# Patient Record
Sex: FEMALE | Race: WHITE | NOT HISPANIC OR LATINO | Employment: FULL TIME | URBAN - METROPOLITAN AREA
[De-identification: names, ages, dates, MRNs, and addresses within clinical notes are randomized per-mention and may not be internally consistent; named-entity substitution may affect disease eponyms.]

---

## 2023-03-31 LAB
ALANINE AMINOTRANSFERASE (SGPT) (U/L) IN SER/PLAS: 36 U/L (ref 7–45)
ALBUMIN (G/DL) IN SER/PLAS: 3.7 G/DL (ref 3.4–5)
ALKALINE PHOSPHATASE (U/L) IN SER/PLAS: 62 U/L (ref 33–110)
ANION GAP IN SER/PLAS: 9 MMOL/L (ref 10–20)
ASPARTATE AMINOTRANSFERASE (SGOT) (U/L) IN SER/PLAS: 31 U/L (ref 9–39)
BASOPHILS (10*3/UL) IN BLOOD BY AUTOMATED COUNT: 0.06 X10E9/L (ref 0–0.1)
BASOPHILS/100 LEUKOCYTES IN BLOOD BY AUTOMATED COUNT: 0.8 % (ref 0–2)
BILIRUBIN TOTAL (MG/DL) IN SER/PLAS: 0.6 MG/DL (ref 0–1.2)
CALCIUM (MG/DL) IN SER/PLAS: 8.7 MG/DL (ref 8.6–10.3)
CARBON DIOXIDE, TOTAL (MMOL/L) IN SER/PLAS: 28 MMOL/L (ref 21–32)
CHLORIDE (MMOL/L) IN SER/PLAS: 104 MMOL/L (ref 98–107)
CREATININE (MG/DL) IN SER/PLAS: 0.76 MG/DL (ref 0.5–1.05)
EOSINOPHILS (10*3/UL) IN BLOOD BY AUTOMATED COUNT: 0.11 X10E9/L (ref 0–0.7)
EOSINOPHILS/100 LEUKOCYTES IN BLOOD BY AUTOMATED COUNT: 1.5 % (ref 0–6)
ERYTHROCYTE DISTRIBUTION WIDTH (RATIO) BY AUTOMATED COUNT: 13.2 % (ref 11.5–14.5)
ERYTHROCYTE MEAN CORPUSCULAR HEMOGLOBIN CONCENTRATION (G/DL) BY AUTOMATED: 31.7 G/DL (ref 32–36)
ERYTHROCYTE MEAN CORPUSCULAR VOLUME (FL) BY AUTOMATED COUNT: 92 FL (ref 80–100)
ERYTHROCYTES (10*6/UL) IN BLOOD BY AUTOMATED COUNT: 4.37 X10E12/L (ref 4–5.2)
GFR FEMALE: >90 ML/MIN/1.73M2
GLUCOSE (MG/DL) IN SER/PLAS: 93 MG/DL (ref 74–99)
HEMATOCRIT (%) IN BLOOD BY AUTOMATED COUNT: 40.4 % (ref 36–46)
HEMOGLOBIN (G/DL) IN BLOOD: 12.8 G/DL (ref 12–16)
IMMATURE GRANULOCYTES/100 LEUKOCYTES IN BLOOD BY AUTOMATED COUNT: 0.9 % (ref 0–0.9)
LEUKOCYTES (10*3/UL) IN BLOOD BY AUTOMATED COUNT: 7.6 X10E9/L (ref 4.4–11.3)
LYMPHOCYTES (10*3/UL) IN BLOOD BY AUTOMATED COUNT: 2.94 X10E9/L (ref 1.2–4.8)
LYMPHOCYTES/100 LEUKOCYTES IN BLOOD BY AUTOMATED COUNT: 38.8 % (ref 13–44)
MONOCYTES (10*3/UL) IN BLOOD BY AUTOMATED COUNT: 0.71 X10E9/L (ref 0.1–1)
MONOCYTES/100 LEUKOCYTES IN BLOOD BY AUTOMATED COUNT: 9.4 % (ref 2–10)
NEUTROPHILS (10*3/UL) IN BLOOD BY AUTOMATED COUNT: 3.68 X10E9/L (ref 1.2–7.7)
NEUTROPHILS/100 LEUKOCYTES IN BLOOD BY AUTOMATED COUNT: 48.6 % (ref 40–80)
PLATELETS (10*3/UL) IN BLOOD AUTOMATED COUNT: 317 X10E9/L (ref 150–450)
POTASSIUM (MMOL/L) IN SER/PLAS: 3.9 MMOL/L (ref 3.5–5.3)
PROTEIN TOTAL: 6 G/DL (ref 6.4–8.2)
SODIUM (MMOL/L) IN SER/PLAS: 137 MMOL/L (ref 136–145)
UREA NITROGEN (MG/DL) IN SER/PLAS: 11 MG/DL (ref 6–23)

## 2023-04-06 ENCOUNTER — HOSPITAL ENCOUNTER (OUTPATIENT)
Dept: DATA CONVERSION | Facility: HOSPITAL | Age: 48
End: 2023-04-06
Attending: ORTHOPAEDIC SURGERY | Admitting: ORTHOPAEDIC SURGERY
Payer: COMMERCIAL

## 2023-04-06 DIAGNOSIS — F41.9 ANXIETY DISORDER, UNSPECIFIED: ICD-10-CM

## 2023-04-06 DIAGNOSIS — S76.012A STRAIN OF MUSCLE, FASCIA AND TENDON OF LEFT HIP, INITIAL ENCOUNTER: ICD-10-CM

## 2023-04-06 DIAGNOSIS — M76.01 GLUTEAL TENDINITIS, RIGHT HIP: ICD-10-CM

## 2023-04-06 DIAGNOSIS — F32.A DEPRESSION, UNSPECIFIED: ICD-10-CM

## 2023-04-06 DIAGNOSIS — Z79.82 LONG TERM (CURRENT) USE OF ASPIRIN: ICD-10-CM

## 2023-04-06 DIAGNOSIS — M70.62 TROCHANTERIC BURSITIS, LEFT HIP: ICD-10-CM

## 2023-08-17 ENCOUNTER — OFFICE VISIT (OUTPATIENT)
Dept: PRIMARY CARE | Facility: CLINIC | Age: 48
End: 2023-08-17
Payer: COMMERCIAL

## 2023-08-17 VITALS
DIASTOLIC BLOOD PRESSURE: 104 MMHG | WEIGHT: 220 LBS | SYSTOLIC BLOOD PRESSURE: 158 MMHG | HEIGHT: 65 IN | OXYGEN SATURATION: 95 % | HEART RATE: 110 BPM | BODY MASS INDEX: 36.65 KG/M2

## 2023-08-17 DIAGNOSIS — I10 HYPERTENSION, ESSENTIAL: Primary | ICD-10-CM

## 2023-08-17 DIAGNOSIS — M54.17 LUMBOSACRAL RADICULOPATHY: ICD-10-CM

## 2023-08-17 PROBLEM — M47.817 LUMBOSACRAL SPONDYLOSIS: Status: ACTIVE | Noted: 2023-08-17

## 2023-08-17 PROBLEM — L05.91 INFECTED PILONIDAL CYST: Status: ACTIVE | Noted: 2023-08-17

## 2023-08-17 PROBLEM — L05.01 PILONIDAL CYST WITH ABSCESS: Status: ACTIVE | Noted: 2023-08-17

## 2023-08-17 PROBLEM — R20.0 NUMBNESS: Status: RESOLVED | Noted: 2023-08-17 | Resolved: 2023-08-17

## 2023-08-17 PROBLEM — L73.9 FOLLICULITIS: Status: ACTIVE | Noted: 2023-08-17

## 2023-08-17 PROBLEM — M70.62 GREATER TROCHANTERIC BURSITIS, LEFT: Status: RESOLVED | Noted: 2023-08-17 | Resolved: 2023-08-17

## 2023-08-17 PROBLEM — G89.29 CHRONIC LOW BACK PAIN WITHOUT SCIATICA: Status: RESOLVED | Noted: 2023-08-17 | Resolved: 2023-08-17

## 2023-08-17 PROBLEM — M79.18 MUSCULOSKELETAL PAIN: Status: ACTIVE | Noted: 2023-08-17

## 2023-08-17 PROBLEM — M25.561 ARTHRALGIA OF RIGHT LOWER LEG: Status: RESOLVED | Noted: 2023-08-17 | Resolved: 2023-08-17

## 2023-08-17 PROBLEM — M25.561 ARTHRALGIA OF RIGHT LOWER LEG: Status: ACTIVE | Noted: 2023-08-17

## 2023-08-17 PROBLEM — J45.30 MILD PERSISTENT ASTHMA WITHOUT COMPLICATION (HHS-HCC): Status: ACTIVE | Noted: 2023-08-17

## 2023-08-17 PROBLEM — R29.898 WEAKNESS OF LEFT HIP: Status: RESOLVED | Noted: 2023-08-17 | Resolved: 2023-08-17

## 2023-08-17 PROBLEM — M46.1 SACROILIITIS (CMS-HCC): Status: ACTIVE | Noted: 2023-08-17

## 2023-08-17 PROBLEM — M96.1 POSTLAMINECTOMY SYNDROME: Status: ACTIVE | Noted: 2023-08-17

## 2023-08-17 PROBLEM — R06.83 SNORING: Status: ACTIVE | Noted: 2023-08-17

## 2023-08-17 PROBLEM — M96.1 POSTLAMINECTOMY SYNDROME: Status: RESOLVED | Noted: 2023-08-17 | Resolved: 2023-08-17

## 2023-08-17 PROBLEM — M96.0 PSEUDARTHROSIS FOLLOWING SPINAL FUSION: Status: ACTIVE | Noted: 2023-08-17

## 2023-08-17 PROBLEM — M25.562 LEFT KNEE PAIN: Status: ACTIVE | Noted: 2023-08-17

## 2023-08-17 PROBLEM — R20.0 NUMBNESS: Status: ACTIVE | Noted: 2023-08-17

## 2023-08-17 PROBLEM — S43.439A GLENOID LABRUM TEAR: Status: RESOLVED | Noted: 2023-08-17 | Resolved: 2023-08-17

## 2023-08-17 PROBLEM — K57.32 DIVERTICULITIS OF COLON: Status: ACTIVE | Noted: 2023-08-17

## 2023-08-17 PROBLEM — R25.2 MUSCLE CRAMP: Status: RESOLVED | Noted: 2023-08-17 | Resolved: 2023-08-17

## 2023-08-17 PROBLEM — M54.50 CHRONIC LOW BACK PAIN WITHOUT SCIATICA: Status: ACTIVE | Noted: 2023-08-17

## 2023-08-17 PROBLEM — M25.512 ACUTE PAIN OF LEFT SHOULDER: Status: ACTIVE | Noted: 2023-08-17

## 2023-08-17 PROBLEM — L73.9 FOLLICULITIS: Status: RESOLVED | Noted: 2023-08-17 | Resolved: 2023-08-17

## 2023-08-17 PROBLEM — M54.50 CHRONIC LOW BACK PAIN WITHOUT SCIATICA: Status: RESOLVED | Noted: 2023-08-17 | Resolved: 2023-08-17

## 2023-08-17 PROBLEM — F41.9 ANXIETY: Status: ACTIVE | Noted: 2023-08-17

## 2023-08-17 PROBLEM — M46.1 SACROILIITIS (CMS-HCC): Status: RESOLVED | Noted: 2023-08-17 | Resolved: 2023-08-17

## 2023-08-17 PROBLEM — B07.9 WART: Status: ACTIVE | Noted: 2023-08-17

## 2023-08-17 PROBLEM — S73.192A LABRAL TEAR OF LEFT HIP JOINT: Status: ACTIVE | Noted: 2023-08-17

## 2023-08-17 PROBLEM — M70.62 GREATER TROCHANTERIC BURSITIS, LEFT: Status: ACTIVE | Noted: 2023-08-17

## 2023-08-17 PROBLEM — S43.439A GLENOID LABRUM TEAR: Status: ACTIVE | Noted: 2023-08-17

## 2023-08-17 PROBLEM — M96.0 PSEUDARTHROSIS FOLLOWING SPINAL FUSION: Status: RESOLVED | Noted: 2023-08-17 | Resolved: 2023-08-17

## 2023-08-17 PROBLEM — M25.552 LEFT HIP PAIN: Status: RESOLVED | Noted: 2023-08-17 | Resolved: 2023-08-17

## 2023-08-17 PROBLEM — M25.512 ACUTE PAIN OF LEFT SHOULDER: Status: RESOLVED | Noted: 2023-08-17 | Resolved: 2023-08-17

## 2023-08-17 PROBLEM — S76.019A RUPTURE OF TENDON OF HIP ABDUCTOR: Status: ACTIVE | Noted: 2023-08-17

## 2023-08-17 PROBLEM — M16.12 ARTHROPATHY OF LEFT HIP: Status: RESOLVED | Noted: 2023-08-17 | Resolved: 2023-08-17

## 2023-08-17 PROBLEM — L05.01 PILONIDAL CYST WITH ABSCESS: Status: RESOLVED | Noted: 2023-08-17 | Resolved: 2023-08-17

## 2023-08-17 PROBLEM — M79.18 MUSCULOSKELETAL PAIN: Status: RESOLVED | Noted: 2023-08-17 | Resolved: 2023-08-17

## 2023-08-17 PROBLEM — R53.83 FATIGUE: Status: ACTIVE | Noted: 2023-08-17

## 2023-08-17 PROBLEM — R29.898 WEAKNESS OF LEFT HIP: Status: ACTIVE | Noted: 2023-08-17

## 2023-08-17 PROBLEM — B07.9 WART: Status: RESOLVED | Noted: 2023-08-17 | Resolved: 2023-08-17

## 2023-08-17 PROBLEM — M75.82 ROTATOR CUFF TENDINITIS, LEFT: Status: ACTIVE | Noted: 2023-08-17

## 2023-08-17 PROBLEM — M75.82 ROTATOR CUFF TENDINITIS, LEFT: Status: RESOLVED | Noted: 2023-08-17 | Resolved: 2023-08-17

## 2023-08-17 PROBLEM — E66.9 OBESITY: Status: ACTIVE | Noted: 2023-08-17

## 2023-08-17 PROBLEM — S73.192A LABRAL TEAR OF LEFT HIP JOINT: Status: RESOLVED | Noted: 2023-08-17 | Resolved: 2023-08-17

## 2023-08-17 PROBLEM — M25.562 LEFT KNEE PAIN: Status: RESOLVED | Noted: 2023-08-17 | Resolved: 2023-08-17

## 2023-08-17 PROBLEM — S76.019A RUPTURE OF TENDON OF HIP ABDUCTOR: Status: RESOLVED | Noted: 2023-08-17 | Resolved: 2023-08-17

## 2023-08-17 PROBLEM — R53.83 FATIGUE: Status: RESOLVED | Noted: 2023-08-17 | Resolved: 2023-08-17

## 2023-08-17 PROBLEM — K57.30 DIVERTICULOSIS OF COLON: Status: ACTIVE | Noted: 2023-08-17

## 2023-08-17 PROBLEM — G89.29 CHRONIC LOW BACK PAIN WITHOUT SCIATICA: Status: ACTIVE | Noted: 2023-08-17

## 2023-08-17 PROBLEM — R06.83 SNORING: Status: RESOLVED | Noted: 2023-08-17 | Resolved: 2023-08-17

## 2023-08-17 PROBLEM — M16.12 ARTHROPATHY OF LEFT HIP: Status: ACTIVE | Noted: 2023-08-17

## 2023-08-17 PROBLEM — R25.2 MUSCLE CRAMP: Status: ACTIVE | Noted: 2023-08-17

## 2023-08-17 PROBLEM — M25.552 LEFT HIP PAIN: Status: ACTIVE | Noted: 2023-08-17

## 2023-08-17 PROBLEM — G47.00 INSOMNIA, PERSISTENT: Status: ACTIVE | Noted: 2023-08-17

## 2023-08-17 PROBLEM — L05.91 INFECTED PILONIDAL CYST: Status: RESOLVED | Noted: 2023-08-17 | Resolved: 2023-08-17

## 2023-08-17 PROCEDURE — 1036F TOBACCO NON-USER: CPT | Performed by: FAMILY MEDICINE

## 2023-08-17 PROCEDURE — 3077F SYST BP >= 140 MM HG: CPT | Performed by: FAMILY MEDICINE

## 2023-08-17 PROCEDURE — 3080F DIAST BP >= 90 MM HG: CPT | Performed by: FAMILY MEDICINE

## 2023-08-17 PROCEDURE — 99213 OFFICE O/P EST LOW 20 MIN: CPT | Performed by: FAMILY MEDICINE

## 2023-08-17 RX ORDER — DOCUSATE SODIUM 100 MG/1
100000 CAPSULE, LIQUID FILLED ORAL 2 TIMES DAILY
COMMUNITY
Start: 2016-11-16 | End: 2023-11-21 | Stop reason: ALTCHOICE

## 2023-08-17 RX ORDER — DILTIAZEM HYDROCHLORIDE 120 MG/1
120 CAPSULE, EXTENDED RELEASE ORAL DAILY
Qty: 30 CAPSULE | Refills: 11 | Status: SHIPPED | OUTPATIENT
Start: 2023-08-17 | End: 2023-11-21 | Stop reason: ALTCHOICE

## 2023-08-17 RX ORDER — AMITRIPTYLINE HYDROCHLORIDE 25 MG/1
TABLET, FILM COATED ORAL
COMMUNITY
Start: 2023-07-28 | End: 2023-11-21 | Stop reason: ALTCHOICE

## 2023-08-17 RX ORDER — MONTELUKAST SODIUM 10 MG/1
1 TABLET ORAL DAILY
COMMUNITY
Start: 2021-06-17 | End: 2023-11-21 | Stop reason: ALTCHOICE

## 2023-08-17 RX ORDER — CELECOXIB 200 MG/1
200 CAPSULE ORAL 2 TIMES DAILY
Qty: 180 CAPSULE | Refills: 3 | Status: SHIPPED | OUTPATIENT
Start: 2023-08-17 | End: 2023-08-17

## 2023-08-17 RX ORDER — DILTIAZEM HYDROCHLORIDE 120 MG/1
120 CAPSULE, EXTENDED RELEASE ORAL
COMMUNITY
End: 2023-08-17 | Stop reason: SDUPTHER

## 2023-08-17 RX ORDER — CELECOXIB 200 MG/1
CAPSULE ORAL
COMMUNITY
Start: 2022-05-23 | End: 2023-08-17 | Stop reason: SDUPTHER

## 2023-08-17 RX ORDER — GABAPENTIN 600 MG/1
1 TABLET ORAL 4 TIMES DAILY
COMMUNITY
Start: 2016-08-15 | End: 2023-11-21 | Stop reason: ALTCHOICE

## 2023-08-17 ASSESSMENT — ENCOUNTER SYMPTOMS
PALPITATIONS: 0
SWEATS: 0
SHORTNESS OF BREATH: 0
ARTHRALGIAS: 1
NECK PAIN: 0
HYPERTENSION: 1
ORTHOPNEA: 0
BACK PAIN: 1
PND: 0
BLURRED VISION: 0
HEADACHES: 1

## 2023-08-17 ASSESSMENT — PATIENT HEALTH QUESTIONNAIRE - PHQ9
SUM OF ALL RESPONSES TO PHQ9 QUESTIONS 1 AND 2: 0
1. LITTLE INTEREST OR PLEASURE IN DOING THINGS: NOT AT ALL
2. FEELING DOWN, DEPRESSED OR HOPELESS: NOT AT ALL

## 2023-08-17 NOTE — PROGRESS NOTES
"Subjective   Patient ID: Vickie Vuong is a 48 y.o. female who presents for Hypertension.    Hypertension  This is a recurrent problem. The current episode started in the past 7 days. The problem has been gradually worsening since onset. The problem is uncontrolled. Associated symptoms include anxiety, headaches and malaise/fatigue. Pertinent negatives include no blurred vision, chest pain, neck pain, orthopnea, palpitations, peripheral edema, PND, shortness of breath or sweats. There are no associated agents to hypertension. Risk factors for coronary artery disease include obesity and stress. Compliance problems include diet and exercise.       It seems that the multiple orthopedic issues were likely driving the problems with the medicine, overall she felt like she did well with the diltiazem and it helped.  Since she has been off of it her blood pressure is much higher.    Restart diltiazem  mg daily  Office visit 2 months  If she feels her blood pressure is better but still room for improvement in a month, call and we can increase the medicine to 180 mg.    Review of Systems   Constitutional:  Positive for malaise/fatigue.   Eyes:  Negative for blurred vision.   Respiratory:  Negative for shortness of breath.    Cardiovascular:  Negative for chest pain, palpitations, orthopnea and PND.   Musculoskeletal:  Positive for arthralgias and back pain. Negative for neck pain.   Neurological:  Positive for headaches.       Objective   BP (!) 158/104   Pulse 110   Ht 1.651 m (5' 5\")   Wt 99.8 kg (220 lb)   SpO2 95%   BMI 36.61 kg/m²     Physical Exam  Constitutional:       Appearance: Normal appearance.   HENT:      Head: Normocephalic and atraumatic.   Cardiovascular:      Rate and Rhythm: Normal rate and regular rhythm.      Heart sounds: Normal heart sounds.   Pulmonary:      Effort: Pulmonary effort is normal.      Breath sounds: Normal breath sounds.   Skin:     General: Skin is warm and dry. "   Neurological:      General: No focal deficit present.      Mental Status: She is alert and oriented to person, place, and time.   Psychiatric:         Mood and Affect: Mood normal.         Behavior: Behavior normal.         Thought Content: Thought content normal.         Judgment: Judgment normal.         Assessment/Plan   Problem List Items Addressed This Visit       Hypertension, essential - Primary    Relevant Medications    dilTIAZem ER (Tiazac) 120 mg 24 hr capsule    Lumbosacral radiculopathy    Relevant Medications    celecoxib (CeleBREX) 200 mg capsule

## 2023-08-25 ENCOUNTER — HOSPITAL ENCOUNTER (OUTPATIENT)
Dept: DATA CONVERSION | Facility: HOSPITAL | Age: 48
End: 2023-08-25
Attending: PAIN MEDICINE | Admitting: PAIN MEDICINE
Payer: COMMERCIAL

## 2023-08-25 DIAGNOSIS — M54.17 RADICULOPATHY, LUMBOSACRAL REGION: ICD-10-CM

## 2023-09-06 VITALS — WEIGHT: 216.05 LBS | BODY MASS INDEX: 36 KG/M2 | HEIGHT: 65 IN

## 2023-09-14 NOTE — H&P
History & Physical Reviewed:   Pregnant/Lactating:  ·  Are You Pregnant unable to answer   ·  Order Pregnancy Test order urine test   ·  Are You Currently Breastfeeding no     I have reviewed the History and Physical dated:  06-Apr-2023   History and Physical reviewed and relevant findings noted. Patient examined to review pertinent physical  findings.: No significant changes   Home Medications Reviewed: no changes noted   Allergies Reviewed: no changes noted       ERAS (Enhanced Recovery After Surgery):  ·  ERAS Patient: no     Consent:   COVID-19 Consent:  ·  COVID-19 Risk Consent Surgeon has reviewed key risks related to the risk of hema COVID-19 and if they contract COVID-19 what the risks are.     Attestation:   Note Completion:  I am a:  Resident/Fellow   Attending Attestation I saw and evaluated the patient.  I personally obtained the key and critical portions of the history and physical exam or was physically present for key and  critical portions performed by the resident/fellow. I reviewed the resident/fellow?s documentation and discussed the patient with the resident/fellow.  I agree with the resident/fellow?s medical decision making as documented in the note.     I personally evaluated the patient on 06-Apr-2023         Electronic Signatures:  Jessica Navarro (Resident))  (Signed 06-Apr-2023 07:15)   Authored: History & Physical Reviewed, ERAS, Consent,  Note Completion  Antonino Tubbs)  (Signed 06-Apr-2023 10:19)   Authored: Note Completion   Co-Signer: History & Physical Reviewed, ERAS, Consent, Note Completion      Last Updated: 06-Apr-2023 10:19 by Antonino Tubbs)

## 2023-10-01 NOTE — OP NOTE
PROCEDURE DETAILS    Preoperative Diagnosis:  Radiculopathy, lumbosacral region, M54.17    Postoperative Diagnosis:  Radiculopathy, lumbosacral region, M54.17    Surgeon: Piyush Vann  Resident/Fellow/Other Assistant: None of these were associated with this case    Procedure:  1. R L5 + S1 TFESI    Anesthesia: No anesthesiologist associated with this case  Estimated Blood Loss: 0  Findings: NA  Additional Details: The patient has a greater than 2-month history of severe low back and leg pain.  The patient has previously had 6 weeks of conservative management with exercise therapy  and medications.  The patient is compliant with home exercises for this issue.  The pain significantly interrupts the patient's physical function.  The patient does not desire another spine surgery.  The procedure was performed at the right L5 and S1  nerve roots because the patient had symptoms in the distribution of those 2 nerve roots.        Operative Report:   Procedure: Right lumbosacral transforaminal epidural steroid injection under fluoroscopic guidance of the right L5 nerve root at the right L5-S1 foramen and  of the right S1 nerve root at the first sacral foramen on the right  Diagnosis: Lumbosacral radiculopathy  Solution used: 1 mL of Kenalog 40 mg, 3 mL normal saline, 1 mL of lidocaine 2%, 5 mL total. 2.5 mL per site  Anesthesia: Local  Complications: None    After informed consent was obtained, the patient was brought to the OR and placed in the prone position. The area in question was prepped and draped  in sterile fashion. An ipsilateral oblique fluoroscopic view of the lumbar spine was obtained and after 2 ml of lidocaine 2% was injected into the skin at each site, a 22-gauge Chiba needle was inserted into the skin and advanced to the 6 clock position  beneath the right L5 pedicle and a 25 gauge Quincke needle was inserted into the skin and advanced into the S1 foramen on the right side under intermittent  fluoroscopic guidance. Proper needle position was confirmed via both AP and lateral fluoroscopy.   1 mL Omnipaque was injected under live fluoroscopy at each site which demonstrated appropriate epidural and nerve root uptake and the absence of any intravascular or intrathecal spread. The local anesthetic steroid solution was then injected incrementally  at each site. The 2 needles were removed. Bleeding was nil. The patient tolerated the procedure well and was transferred to the recovery room in good condition.                        Attestation:   Note Completion:  Attending Attestation I performed the procedure without a resident         Electronic Signatures:  Piyush Vann)  (Signed 25-Aug-2023 17:27)   Authored: Post-Operative Note, Chart Review, Note Completion      Last Updated: 25-Aug-2023 17:27 by Piyush Vann)

## 2023-10-02 NOTE — OP NOTE
PROCEDURE DETAILS    Preoperative Diagnosis:  Left hip abductor deficiency   Postoperative Diagnosis:  Left hip abductor deficiency   Surgeon: ALLYSON Tubbs MD  Resident/Fellow/Other Assistant: JACQUE Navarro MD    Procedure:  Revision left hip abductor repair with Achilles tendon allograft augmentation   Left hip trochanteric bursectomy   Anesthesia: General with regional   Estimated Blood Loss: 20 mL  Blood Replaced: None  Findings: Consistent with preoperative diagnosis   Specimens(s) Collected: no,     Complications: None  Patient Returned To/Condition: PACU in good condition                                 Attestation:   Note Completion:  Attending Attestation I was present for the entire procedure    I am a: Resident/Fellow         Electronic Signatures:  Jessica Navarro (Resident))  (Signed 06-Apr-2023 10:10)   Authored: Post-Operative Note, Chart Review, Note Completion  Antonino Tubbs)  (Signed 07-Apr-2023 08:18)   Authored: Note Completion   Co-Signer: Post-Operative Note, Chart Review, Note Completion      Last Updated: 07-Apr-2023 08:18 by Antonino Tubbs)

## 2023-10-10 ENCOUNTER — APPOINTMENT (OUTPATIENT)
Dept: PRIMARY CARE | Facility: CLINIC | Age: 48
End: 2023-10-10
Payer: COMMERCIAL

## 2023-10-13 ENCOUNTER — APPOINTMENT (OUTPATIENT)
Dept: PRIMARY CARE | Facility: CLINIC | Age: 48
End: 2023-10-13
Payer: COMMERCIAL

## 2023-10-16 ENCOUNTER — PHARMACY VISIT (OUTPATIENT)
Dept: PHARMACY | Facility: CLINIC | Age: 48
End: 2023-10-16
Payer: COMMERCIAL

## 2023-10-16 PROCEDURE — RXMED WILLOW AMBULATORY MEDICATION CHARGE

## 2023-10-18 ENCOUNTER — PHARMACY VISIT (OUTPATIENT)
Dept: PHARMACY | Facility: CLINIC | Age: 48
End: 2023-10-18
Payer: COMMERCIAL

## 2023-10-18 PROCEDURE — RXMED WILLOW AMBULATORY MEDICATION CHARGE

## 2023-11-01 ENCOUNTER — OFFICE VISIT (OUTPATIENT)
Dept: ORTHOPEDIC SURGERY | Facility: HOSPITAL | Age: 48
End: 2023-11-01
Payer: COMMERCIAL

## 2023-11-01 DIAGNOSIS — S73.192A TEAR OF LEFT ACETABULAR LABRUM, INITIAL ENCOUNTER: Primary | ICD-10-CM

## 2023-11-01 DIAGNOSIS — S76.012D TEAR OF LEFT GLUTEUS MEDIUS TENDON, SUBSEQUENT ENCOUNTER: ICD-10-CM

## 2023-11-01 PROCEDURE — 3080F DIAST BP >= 90 MM HG: CPT | Performed by: PHYSICIAN ASSISTANT

## 2023-11-01 PROCEDURE — 3077F SYST BP >= 140 MM HG: CPT | Performed by: PHYSICIAN ASSISTANT

## 2023-11-01 PROCEDURE — 99213 OFFICE O/P EST LOW 20 MIN: CPT | Performed by: PHYSICIAN ASSISTANT

## 2023-11-01 PROCEDURE — 1036F TOBACCO NON-USER: CPT | Performed by: PHYSICIAN ASSISTANT

## 2023-11-01 NOTE — PROGRESS NOTES
Patient is here today close to 7 months out from her left revision open gluteal repair with Achilles allograft augment.  Date of surgery 4/6/2023.  She is doing great.  She only has a little bit of soreness when she lays on that side.  She is very happy with her outcome.  She is starting to notice some workouts.  She is having pain in the groin when she does this.  Positive FADIR.   She does have some early degenerative changes noted in the hip and femoral acetabular impingement.  She was also noted to have a labral tear on her prior MRI.  She had a fluoroscopy guided intra-articular injection in the past that gave her significant relief back where she works.  She was given an order she would prefer to avoid any surgical intervention for this.  I did give her an order for a repeat injection and she can contact the office if she would like to repeat this in the future.      Isabel Whitten PA-C

## 2023-11-20 ENCOUNTER — PHARMACY VISIT (OUTPATIENT)
Dept: PHARMACY | Facility: CLINIC | Age: 48
End: 2023-11-20
Payer: COMMERCIAL

## 2023-11-20 PROCEDURE — RXMED WILLOW AMBULATORY MEDICATION CHARGE

## 2023-11-20 RX ORDER — AMITRIPTYLINE HYDROCHLORIDE 25 MG/1
TABLET, FILM COATED ORAL
Qty: 30 TABLET | Refills: 4 | OUTPATIENT
Start: 2023-11-20 | End: 2024-05-06 | Stop reason: SDUPTHER

## 2023-11-21 ENCOUNTER — PHARMACY VISIT (OUTPATIENT)
Dept: PHARMACY | Facility: CLINIC | Age: 48
End: 2023-11-21
Payer: COMMERCIAL

## 2023-11-21 ENCOUNTER — HOSPITAL ENCOUNTER (OUTPATIENT)
Dept: RADIOLOGY | Facility: HOSPITAL | Age: 48
Discharge: HOME | End: 2023-11-21
Payer: COMMERCIAL

## 2023-11-21 ENCOUNTER — OFFICE VISIT (OUTPATIENT)
Dept: PAIN MEDICINE | Facility: CLINIC | Age: 48
End: 2023-11-21
Payer: COMMERCIAL

## 2023-11-21 VITALS
WEIGHT: 220 LBS | DIASTOLIC BLOOD PRESSURE: 84 MMHG | RESPIRATION RATE: 16 BRPM | BODY MASS INDEX: 36.65 KG/M2 | HEIGHT: 65 IN | HEART RATE: 108 BPM | SYSTOLIC BLOOD PRESSURE: 153 MMHG

## 2023-11-21 DIAGNOSIS — M25.552 LEFT HIP PAIN: ICD-10-CM

## 2023-11-21 DIAGNOSIS — Z98.1 ARTHRODESIS STATUS: ICD-10-CM

## 2023-11-21 DIAGNOSIS — M54.17 LUMBOSACRAL RADICULOPATHY: Primary | ICD-10-CM

## 2023-11-21 DIAGNOSIS — S73.192A TEAR OF LEFT ACETABULAR LABRUM, INITIAL ENCOUNTER: ICD-10-CM

## 2023-11-21 DIAGNOSIS — M47.27 OSTEOARTHRITIS OF SPINE WITH RADICULOPATHY, LUMBOSACRAL REGION: ICD-10-CM

## 2023-11-21 DIAGNOSIS — M48.062 NEUROGENIC CLAUDICATION DUE TO LUMBAR SPINAL STENOSIS: ICD-10-CM

## 2023-11-21 DIAGNOSIS — M54.17 LUMBOSACRAL NEURITIS: ICD-10-CM

## 2023-11-21 PROCEDURE — RXMED WILLOW AMBULATORY MEDICATION CHARGE

## 2023-11-21 PROCEDURE — 77002 NEEDLE LOCALIZATION BY XRAY: CPT | Mod: LEFT SIDE | Performed by: RADIOLOGY

## 2023-11-21 PROCEDURE — 77002 NEEDLE LOCALIZATION BY XRAY: CPT | Mod: LT

## 2023-11-21 PROCEDURE — 99213 OFFICE O/P EST LOW 20 MIN: CPT | Mod: 25 | Performed by: PHYSICIAN ASSISTANT

## 2023-11-21 PROCEDURE — 96372 THER/PROPH/DIAG INJ SC/IM: CPT | Performed by: PHYSICIAN ASSISTANT

## 2023-11-21 PROCEDURE — 2500000004 HC RX 250 GENERAL PHARMACY W/ HCPCS (ALT 636 FOR OP/ED): Performed by: PHYSICIAN ASSISTANT

## 2023-11-21 PROCEDURE — 20610 DRAIN/INJ JOINT/BURSA W/O US: CPT | Mod: LEFT SIDE | Performed by: RADIOLOGY

## 2023-11-21 PROCEDURE — 2550000001 HC RX 255 CONTRASTS: Performed by: PHYSICIAN ASSISTANT

## 2023-11-21 RX ORDER — GABAPENTIN 600 MG/1
1200 TABLET ORAL 3 TIMES DAILY
Qty: 540 TABLET | Refills: 1 | Status: SHIPPED | OUTPATIENT
Start: 2023-11-21 | End: 2024-05-07 | Stop reason: ALTCHOICE

## 2023-11-21 RX ORDER — METHYLPREDNISOLONE ACETATE 40 MG/ML
40 INJECTION, SUSPENSION INTRA-ARTICULAR; INTRALESIONAL; INTRAMUSCULAR; SOFT TISSUE ONCE
Status: COMPLETED | OUTPATIENT
Start: 2023-11-21 | End: 2023-11-21

## 2023-11-21 RX ADMIN — IOHEXOL 6 ML: 350 INJECTION, SOLUTION INTRAVENOUS at 13:43

## 2023-11-21 RX ADMIN — METHYLPREDNISOLONE ACETATE 40 MG: 40 INJECTION, SUSPENSION INTRA-ARTICULAR; INTRALESIONAL; INTRAMUSCULAR; INTRASYNOVIAL; SOFT TISSUE at 14:02

## 2023-11-21 ASSESSMENT — PAIN SCALES - GENERAL: PAINLEVEL: 2

## 2023-11-21 ASSESSMENT — ENCOUNTER SYMPTOMS
EYES NEGATIVE: 1
ARTHRALGIAS: 1
ENDOCRINE NEGATIVE: 1
CARDIOVASCULAR NEGATIVE: 1
CONSTITUTIONAL NEGATIVE: 1
PSYCHIATRIC NEGATIVE: 1
BACK PAIN: 1
RESPIRATORY NEGATIVE: 1
ALLERGIC/IMMUNOLOGIC NEGATIVE: 1
HEMATOLOGIC/LYMPHATIC NEGATIVE: 1
GASTROINTESTINAL NEGATIVE: 1

## 2023-11-21 ASSESSMENT — COLUMBIA-SUICIDE SEVERITY RATING SCALE - C-SSRS
6. HAVE YOU EVER DONE ANYTHING, STARTED TO DO ANYTHING, OR PREPARED TO DO ANYTHING TO END YOUR LIFE?: NO
2. HAVE YOU ACTUALLY HAD ANY THOUGHTS OF KILLING YOURSELF?: NO
1. IN THE PAST MONTH, HAVE YOU WISHED YOU WERE DEAD OR WISHED YOU COULD GO TO SLEEP AND NOT WAKE UP?: NO

## 2023-11-21 NOTE — PROGRESS NOTES
Subjective   Patient ID: Vickie Vuong is a 48 y.o. female who presents for Pain (FUV for yanni low back pain L>R 2/10 today. MRI done 9-13-23 at . Pain is constant, achey. Motrin helps decrease pain. ANA = 30/100. Smoking, depression screen negative. BMI eduction provided. ).  Refill for gabapentin.  Patient is a 48-year-old female.  She presents today for follow-up after undergoing an updated lumbar MRI.  She states though that at this time, things are overall fairly well controlled.  She has some back and leg pain that she rates a 2/10 but she states it is manageable.  She states that usually in the winter she is not very active.  She does not try to do a month so she does not notice much by the way of pain or problems at this time.  She is is here today to review the MRI and she also would like a refill of her gabapentin.  She usually uses 1200 mg at night.  Sometimes 600 mg in the morning depending on how she is feeling and how she is doing.  She feels that between the previous injection the intermittent gabapentin, and the fact that it is getting into the colder season she is overall doing fairly well.        Review of Systems   Constitutional: Negative.    HENT: Negative.     Eyes: Negative.    Respiratory: Negative.     Cardiovascular: Negative.    Gastrointestinal: Negative.    Endocrine: Negative.    Genitourinary: Negative.    Musculoskeletal:  Positive for arthralgias, back pain and gait problem.   Skin: Negative.    Allergic/Immunologic: Negative.    Hematological: Negative.    Psychiatric/Behavioral: Negative.         Objective   Physical Exam  Vitals and nursing note reviewed.   Constitutional:       Appearance: Normal appearance. She is obese.   HENT:      Head: Normocephalic and atraumatic.      Right Ear: External ear normal.      Left Ear: External ear normal.      Mouth/Throat:      Mouth: Mucous membranes are moist.      Pharynx: Oropharynx is clear.   Eyes:      Conjunctiva/sclera:  Conjunctivae normal.      Pupils: Pupils are equal, round, and reactive to light.   Cardiovascular:      Rate and Rhythm: Normal rate and regular rhythm.      Pulses: Normal pulses.   Musculoskeletal:         General: Normal range of motion.      Cervical back: Normal range of motion.      Comments: 5/5 strength   Skin:     General: Skin is warm and dry.   Neurological:      General: No focal deficit present.      Mental Status: She is alert and oriented to person, place, and time. Mental status is at baseline.   Psychiatric:         Mood and Affect: Mood normal.         Behavior: Behavior normal.         Thought Content: Thought content normal.         Judgment: Judgment normal.         MR lumbar spine wo IV contrast  Status: Final result     PACS Images     Show images for MR lumbar spine wo IV contrast  Signed by    Signed Time Phone Pager   Cher Preston MD 9/18/2023 10:44 [phone]     Exam Information    Status Exam Begun Exam Ended   Final  9/18/2023 07:45     Study Result    Narrative & Impression   Interpreted By:  CHER PRESTON MD  MRN: [id number]  Patient Name: LEORA QUILES     STUDY:  MRI L-SPINE WO; ;  9/18/2023 7:45 am     INDICATION:  lower back and leg pain  M54.50: Chronic low back pain without  sciatica, unspecified back pain laterality M54.17: Lumbosacral  radiculopathy M47.817: Lumbosacral spondylosis.     COMPARISON:  MRI of the lumbar spine from 07/11/2022.     ACCESSION NUMBER(S):  [id number]     ORDERING CLINICIAN:  SUBHA ESTES     TECHNIQUE:  MRI of the lumbar spine was performed with acquisition of sagittal  T2, sagittal T1, sagittal STIR, axial T1, and axial T2 weighted  sequences.     FINDINGS:  This report assumes 5 non-rib bearing lumbar vertebral bodies. The  lowest intervertebral disc will be labeled L5-S1.     There is stable slight retrolisthesis at L2-L3. Grade 1  anterolisthesis at L3-L4 appears slightly more pronounced. There is  stable grade 1 anterolisthesis at  L5-S1. There is stable mild  posterior wedging of the L5 vertebral body. Remaining vertebral body  heights are maintained.     There are postoperative changes from laminectomies at L5 with spinal  fusion including placement of bilateral pedicular screws which  terminate within the L4, L5, and S1 vertebral bodies and are  interconnected by parallel rods. There is also an intervertebral disc  spacer at L5-S1. There is disc desiccation at multiple levels. There  are stable chronic degenerative endplate changes at multiple levels.     The conus medullaris terminates at the level of L1-L2 and is  unremarkable in appearance.     At T12-L1, there is no posterior disc contour abnormality. There is  no spinal canal stenosis or neural foraminal narrowing. There is no  facet osteoarthropathy.     At L1-L2, there is striking posterior disc contour abnormality. There  is no spinal canal stenosis or neural foraminal narrowing. There is  no facet osteoarthropathy.     At L2-L3, there is a stable small diffuse disc bulge. There is no  spinal canal stenosis. There is stable extension of disc into the  neural foramina and there is at most mild left neural foraminal  narrowing, unchanged. There is stable moderate left and mild right  facet osteoarthropathy.     At L3-L4, there is mild spinal canal stenosis due to combination of  diffuse disc bulge, grade 1 anterolisthesis, ligamentum flavum  thickening, prominent posterior epidural fat, and marked facet  osteoarthropathy. Spinal canal stenosis has slightly increased since  prior. There is extension of disc into the bilateral neural foramina  and along with facet osteoarthropathy causes mild bilateral neural  foraminal narrowing, unchanged.     At L4-L5, there is no posterior disc contour abnormality. There is no  spinal canal stenosis or neural foraminal narrowing. Not adequately  evaluate the facet joints due to susceptibility artifact from  surgical hardware, noting this, there is  fusion of the facet joints  related to prior spinal surgery.     At L5-S1, is no posterior disc contour abnormality. There is no  spinal canal stenosis. There is stable moderate to marked right and  moderate left neural foraminal narrowing primarily due to facet  osteoarthropathy. There is stable signal abnormality located within  the right lateral epidural space, probably sequela of prior surgery.     IMPRESSION:  Compared to the MRI lumbar spine study from 07/11/2022, mild spinal  canal stenosis at L3-L4 has slightly increased with slight increase  in grade 1 anterolisthesis. Otherwise, similar degenerative changes  of the lumbar spine and postoperative changes.     This study was interpreted at [facility].     Assessment/Plan   Diagnoses and all orders for this visit:  Lumbosacral radiculopathy  Osteoarthritis of spine with radiculopathy, lumbosacral region  Arthrodesis status  Lumbosacral neuritis  Neurogenic claudication due to lumbar spinal stenosis       Patient is a 48-year-old female with a past medical history significant for previous lumbar fusion, adjacent level stenosis, lumbar neuritis, and chronic pain.  We reviewed her MRI.  At this time, she does not feel that a repeat injection is necessary.  She feels that things are overall going fairly well for her.  She feels that between the gabapentin, the previous injection, and the fact that it is winter and she is not super active in the winter that things are overall going fairly well for her.  She is feeling fairly well and she is fairly comfortable.  OARRS was reviewed.  A refill of gabapentin was sent to the pharmacy.  She is going to call us should she decide that an injection is necessary or that she needs anything from our services in the meantime.  Otherwise, at this time she does not feel that she needs to do anything differently and while she was continue things how they are.

## 2023-11-28 ENCOUNTER — APPOINTMENT (OUTPATIENT)
Dept: PAIN MEDICINE | Facility: CLINIC | Age: 48
End: 2023-11-28
Payer: COMMERCIAL

## 2023-12-10 ENCOUNTER — PHARMACY VISIT (OUTPATIENT)
Dept: PHARMACY | Facility: CLINIC | Age: 48
End: 2023-12-10

## 2023-12-10 PROCEDURE — RXOTC WILLOW AMBULATORY OTC CHARGE

## 2023-12-29 PROCEDURE — RXMED WILLOW AMBULATORY MEDICATION CHARGE

## 2023-12-29 RX ORDER — MONTELUKAST SODIUM 10 MG/1
10 TABLET ORAL NIGHTLY
Qty: 30 TABLET | Refills: 3 | Status: CANCELLED | OUTPATIENT
Start: 2023-12-29 | End: 2024-12-28

## 2023-12-31 ENCOUNTER — PHARMACY VISIT (OUTPATIENT)
Dept: PHARMACY | Facility: CLINIC | Age: 48
End: 2023-12-31
Payer: COMMERCIAL

## 2024-01-29 DIAGNOSIS — J45.30 MILD PERSISTENT ASTHMA WITHOUT COMPLICATION (HHS-HCC): Primary | ICD-10-CM

## 2024-01-29 PROCEDURE — RXMED WILLOW AMBULATORY MEDICATION CHARGE

## 2024-01-29 RX ORDER — MONTELUKAST SODIUM 10 MG/1
10 TABLET ORAL NIGHTLY
Qty: 90 TABLET | Refills: 3 | Status: SHIPPED | OUTPATIENT
Start: 2024-01-29 | End: 2025-01-28

## 2024-02-02 ENCOUNTER — PHARMACY VISIT (OUTPATIENT)
Dept: PHARMACY | Facility: CLINIC | Age: 49
End: 2024-02-02
Payer: COMMERCIAL

## 2024-02-29 PROCEDURE — RXMED WILLOW AMBULATORY MEDICATION CHARGE

## 2024-03-02 ENCOUNTER — PHARMACY VISIT (OUTPATIENT)
Dept: PHARMACY | Facility: CLINIC | Age: 49
End: 2024-03-02
Payer: COMMERCIAL

## 2024-04-08 PROCEDURE — RXMED WILLOW AMBULATORY MEDICATION CHARGE

## 2024-04-09 ENCOUNTER — PHARMACY VISIT (OUTPATIENT)
Dept: PHARMACY | Facility: CLINIC | Age: 49
End: 2024-04-09
Payer: COMMERCIAL

## 2024-04-09 ENCOUNTER — OFFICE VISIT (OUTPATIENT)
Dept: PAIN MEDICINE | Facility: CLINIC | Age: 49
End: 2024-04-09
Payer: COMMERCIAL

## 2024-04-09 VITALS
SYSTOLIC BLOOD PRESSURE: 143 MMHG | WEIGHT: 211 LBS | HEART RATE: 93 BPM | DIASTOLIC BLOOD PRESSURE: 85 MMHG | RESPIRATION RATE: 16 BRPM | BODY MASS INDEX: 35.11 KG/M2

## 2024-04-09 DIAGNOSIS — M47.27 OSTEOARTHRITIS OF SPINE WITH RADICULOPATHY, LUMBOSACRAL REGION: ICD-10-CM

## 2024-04-09 DIAGNOSIS — M54.17 LUMBOSACRAL RADICULOPATHY: Primary | ICD-10-CM

## 2024-04-09 PROCEDURE — 99213 OFFICE O/P EST LOW 20 MIN: CPT | Performed by: PHYSICIAN ASSISTANT

## 2024-04-09 PROCEDURE — RXMED WILLOW AMBULATORY MEDICATION CHARGE

## 2024-04-09 RX ORDER — PREGABALIN 200 MG/1
200 CAPSULE ORAL NIGHTLY
Qty: 30 CAPSULE | Refills: 1 | Status: SHIPPED | OUTPATIENT
Start: 2024-04-09

## 2024-04-09 ASSESSMENT — ENCOUNTER SYMPTOMS
GASTROINTESTINAL NEGATIVE: 1
HEMATOLOGIC/LYMPHATIC NEGATIVE: 1
NUMBNESS: 1
PSYCHIATRIC NEGATIVE: 1
EYES NEGATIVE: 1
CARDIOVASCULAR NEGATIVE: 1
CONSTITUTIONAL NEGATIVE: 1
MUSCULOSKELETAL NEGATIVE: 1
ALLERGIC/IMMUNOLOGIC NEGATIVE: 1
RESPIRATORY NEGATIVE: 1
ENDOCRINE NEGATIVE: 1

## 2024-04-09 ASSESSMENT — PATIENT HEALTH QUESTIONNAIRE - PHQ9
1. LITTLE INTEREST OR PLEASURE IN DOING THINGS: NOT AT ALL
2. FEELING DOWN, DEPRESSED OR HOPELESS: NOT AT ALL
SUM OF ALL RESPONSES TO PHQ9 QUESTIONS 1 AND 2: 0

## 2024-04-09 ASSESSMENT — COLUMBIA-SUICIDE SEVERITY RATING SCALE - C-SSRS
2. HAVE YOU ACTUALLY HAD ANY THOUGHTS OF KILLING YOURSELF?: NO
1. IN THE PAST MONTH, HAVE YOU WISHED YOU WERE DEAD OR WISHED YOU COULD GO TO SLEEP AND NOT WAKE UP?: NO
6. HAVE YOU EVER DONE ANYTHING, STARTED TO DO ANYTHING, OR PREPARED TO DO ANYTHING TO END YOUR LIFE?: NO

## 2024-04-09 NOTE — PROGRESS NOTES
"Subjective   Patient ID: Vickie Vuong is a 48 y.o. female who presents for Pain (FUV for R low back pain; pain score 1/10 today. Patient states pain is more of an \"irritation.\" Pain is described as a \"nagging constant pain\". Excessive exercise or activity will increase pain. Nothing seems to take pain completely away. ANA = 12/100. Falls screen N/A. Depression and smoking screen negative. ).     Connie Dominguez RN 04/09/24 3:18 PM     Patient is a 48-year-old female.  She presents today for eye open to take a 3-month medication management follow-up.  She states though that at this time, things are overall fairly well controlled.  She has some back and leg pain that she rates a 1/10 but she states it is manageable.  It is an aching type pain that is constant.  She has been trying to be more active.  She has been trying to lose weight.  She has been using gabapentin 1800 mg at bedtime.  She does not take any during the day.  She tolerates it.  She is open to trying other things and getting more relief if she can but does not want to be too aggressive.  She does not want to do any injections or have another surgery.    She has a history of L5-S1 ALIF on 7/15/15 done by Dr. Gianni Cordova and then a posterior lumbar fusion L4-S1 in 2017 by Dr. Humza Boyd.        Review of Systems   Constitutional: Negative.    HENT: Negative.     Eyes: Negative.    Respiratory: Negative.     Cardiovascular: Negative.    Gastrointestinal: Negative.    Endocrine: Negative.    Genitourinary: Negative.    Musculoskeletal: Negative.    Skin: Negative.    Allergic/Immunologic: Negative.    Neurological:  Positive for numbness.   Hematological: Negative.    Psychiatric/Behavioral: Negative.         Objective   Physical Exam  Vitals and nursing note reviewed.   Constitutional:       Appearance: Normal appearance.   HENT:      Head: Normocephalic and atraumatic.      Right Ear: External ear normal.      Left Ear: External ear normal.      Nose: Nose " normal.      Mouth/Throat:      Pharynx: Oropharynx is clear.   Eyes:      Conjunctiva/sclera: Conjunctivae normal.   Cardiovascular:      Rate and Rhythm: Normal rate and regular rhythm.      Pulses: Normal pulses.   Pulmonary:      Effort: Pulmonary effort is normal.   Musculoskeletal:         General: Normal range of motion.      Cervical back: Normal range of motion.      Comments: 5/5 strength   Skin:     General: Skin is warm and dry.   Neurological:      General: No focal deficit present.      Mental Status: She is alert and oriented to person, place, and time. Mental status is at baseline.   Psychiatric:         Mood and Affect: Mood normal.         Behavior: Behavior normal.         Thought Content: Thought content normal.         Judgment: Judgment normal.       MR lumbar spine wo IV contrast  Status: Final result     PACS Images     Show images for MR lumbar spine wo IV contrast  Signed by    Signed Time Phone Pager   Cher Preston MD 9/18/2023 10:44 [phone]     Exam Information    Status Exam Begun Exam Ended   Final  9/18/2023 07:45     Study Result    Narrative   Interpreted By:  CHER PRESTON MD  MRN: [id number]  Patient Name: LEORA QUILES     STUDY:  MRI L-SPINE WO; ;  9/18/2023 7:45 am     INDICATION:  lower back and leg pain  M54.50: Chronic low back pain without  sciatica, unspecified back pain laterality M54.17: Lumbosacral  radiculopathy M47.817: Lumbosacral spondylosis.     COMPARISON:  MRI of the lumbar spine from 07/11/2022.     ACCESSION NUMBER(S):  [id number]     ORDERING CLINICIAN:  SUBHA ESTES     TECHNIQUE:  MRI of the lumbar spine was performed with acquisition of sagittal  T2, sagittal T1, sagittal STIR, axial T1, and axial T2 weighted  sequences.     FINDINGS:  This report assumes 5 non-rib bearing lumbar vertebral bodies. The  lowest intervertebral disc will be labeled L5-S1.     There is stable slight retrolisthesis at L2-L3. Grade 1  anterolisthesis at L3-L4 appears  slightly more pronounced. There is  stable grade 1 anterolisthesis at L5-S1. There is stable mild  posterior wedging of the L5 vertebral body. Remaining vertebral body  heights are maintained.     There are postoperative changes from laminectomies at L5 with spinal  fusion including placement of bilateral pedicular screws which  terminate within the L4, L5, and S1 vertebral bodies and are  interconnected by parallel rods. There is also an intervertebral disc  spacer at L5-S1. There is disc desiccation at multiple levels. There  are stable chronic degenerative endplate changes at multiple levels.     The conus medullaris terminates at the level of L1-L2 and is  unremarkable in appearance.     At T12-L1, there is no posterior disc contour abnormality. There is  no spinal canal stenosis or neural foraminal narrowing. There is no  facet osteoarthropathy.     At L1-L2, there is striking posterior disc contour abnormality. There  is no spinal canal stenosis or neural foraminal narrowing. There is  no facet osteoarthropathy.     At L2-L3, there is a stable small diffuse disc bulge. There is no  spinal canal stenosis. There is stable extension of disc into the  neural foramina and there is at most mild left neural foraminal  narrowing, unchanged. There is stable moderate left and mild right  facet osteoarthropathy.     At L3-L4, there is mild spinal canal stenosis due to combination of  diffuse disc bulge, grade 1 anterolisthesis, ligamentum flavum  thickening, prominent posterior epidural fat, and marked facet  osteoarthropathy. Spinal canal stenosis has slightly increased since  prior. There is extension of disc into the bilateral neural foramina  and along with facet osteoarthropathy causes mild bilateral neural  foraminal narrowing, unchanged.     At L4-L5, there is no posterior disc contour abnormality. There is no  spinal canal stenosis or neural foraminal narrowing. Not adequately  evaluate the facet joints due to  susceptibility artifact from  surgical hardware, noting this, there is fusion of the facet joints  related to prior spinal surgery.     At L5-S1, is no posterior disc contour abnormality. There is no  spinal canal stenosis. There is stable moderate to marked right and  moderate left neural foraminal narrowing primarily due to facet  osteoarthropathy. There is stable signal abnormality located within  the right lateral epidural space, probably sequela of prior surgery.      Impression   Compared to the MRI lumbar spine study from 07/11/2022, mild spinal  canal stenosis at L3-L4 has slightly increased with slight increase  in grade 1 anterolisthesis. Otherwise, similar degenerative changes  of the lumbar spine and postoperative changes.     This study was interpreted at [facility].     MACRO:  None     Result History    MR lumbar spine wo IV contrast (Order #[id number]) on 9/18/2023 - Order Result History Report    XR spine  Status: Final result     PACS Images     Show images for XR spine  Signed by    Signed Time Phone Pager   Kirby Dumont DO 8/18/2023 10:43 [phone]     Exam Information    Status Exam Begun Exam Ended   Final  8/17/2023 06:54     Study Result    Narrative   Interpreted By:  KIRBY DUMONT DO  MRN: [id number]  Patient Name: LEORA QUILES     STUDY:  SPINE, LUMBOSACRAL  CMPLT(BENDING); ;  8/17/2023 6:54 am     INDICATION:  pain  M54.17: Lumbosacral radiculopathy.     COMPARISON:  09/26/2020     ACCESSION NUMBER(S):  [id number]     ORDERING CLINICIAN:  SLIME CRYSTAL     FINDINGS:  7 view lumbar spine     Bones appear demineralized. Postoperative changes with indwelling  surgical hardware at the lower lumbar and lumbosacral spine with  indwelling surgical hardware appearing grossly intact and similar to  prior. No new significant compression deformity. Facet arthropathy  mid to lower lumbar spine. Multilevel disc space narrowing most  notably L5-S1 overall  slightly worsened since prior. Once again there  is approximate 9 mm anterolisthesis L5 on S1 in the neutral position.  This again measures approximately 9 mm with flexion and 1 cm with  extension. There is also mild anterolisthesis L3 on L4 during flexion  which is new or slightly worsened. This is less pronounced in  extension.     Visualized bowel gas pattern is nonspecific.      Impression   Postoperative changes lower lumbar and lumbosacral spine with  indwelling surgical hardware redemonstrated as above.     Approximate 9 mm anterolisthesis L5 on S1 redemonstrated as described  grossly similar to prior. Mild anterolisthesis of L3 on L4 during  flexion, new or slightly worsened.        MACRO:  None     Assessment/Plan   Diagnoses and all orders for this visit:  Lumbosacral radiculopathy  Osteoarthritis of spine with radiculopathy, lumbosacral region       Patient is a 48-year-old female with a past medical history significant for previous lumbar fusion, adjacent level lumbar spondylolisthesis and lumbar neuritis.  At this time, we once again reviewed her MRI.  We discussed different options but does not want to have an injection.  She does not want to have another surgery.  She states that the pain is controlled with the gabapentin but is still an aching type pain that is always present.  We discussed her gabapentin.  She is using 1800 mg at bedtime.  We discussed trialing Lyrica.  Potential side effects were discussed.  How to switch was discussed.  She is going to discontinue the gabapentin and trial Lyrica.  200 mg at bedtime.  OARRS reviewed.  Prescription sent to the pharmacy.  Follow-up in 1 month.  Call the clinic sooner if necessary.

## 2024-05-02 PROCEDURE — RXMED WILLOW AMBULATORY MEDICATION CHARGE

## 2024-05-06 ENCOUNTER — OFFICE VISIT (OUTPATIENT)
Dept: PRIMARY CARE | Facility: CLINIC | Age: 49
End: 2024-05-06
Payer: COMMERCIAL

## 2024-05-06 VITALS
SYSTOLIC BLOOD PRESSURE: 146 MMHG | DIASTOLIC BLOOD PRESSURE: 88 MMHG | HEART RATE: 81 BPM | OXYGEN SATURATION: 95 % | WEIGHT: 209.1 LBS | HEIGHT: 65 IN | BODY MASS INDEX: 34.84 KG/M2

## 2024-05-06 DIAGNOSIS — I10 HYPERTENSION, ESSENTIAL: Primary | ICD-10-CM

## 2024-05-06 DIAGNOSIS — G43.009 MIGRAINE WITHOUT AURA AND WITHOUT STATUS MIGRAINOSUS, NOT INTRACTABLE: ICD-10-CM

## 2024-05-06 PROCEDURE — 99214 OFFICE O/P EST MOD 30 MIN: CPT | Performed by: FAMILY MEDICINE

## 2024-05-06 PROCEDURE — 1036F TOBACCO NON-USER: CPT | Performed by: FAMILY MEDICINE

## 2024-05-06 PROCEDURE — RXMED WILLOW AMBULATORY MEDICATION CHARGE

## 2024-05-06 PROCEDURE — 3079F DIAST BP 80-89 MM HG: CPT | Performed by: FAMILY MEDICINE

## 2024-05-06 PROCEDURE — 3077F SYST BP >= 140 MM HG: CPT | Performed by: FAMILY MEDICINE

## 2024-05-06 RX ORDER — DILTIAZEM HYDROCHLORIDE 180 MG/1
180 CAPSULE, COATED, EXTENDED RELEASE ORAL DAILY
Qty: 90 CAPSULE | Refills: 3 | Status: SHIPPED | OUTPATIENT
Start: 2024-05-06 | End: 2025-05-06

## 2024-05-06 RX ORDER — AMITRIPTYLINE HYDROCHLORIDE 50 MG/1
50 TABLET, FILM COATED ORAL NIGHTLY
Qty: 30 TABLET | Refills: 11 | Status: SHIPPED | OUTPATIENT
Start: 2024-05-06 | End: 2025-05-06

## 2024-05-06 ASSESSMENT — PATIENT HEALTH QUESTIONNAIRE - PHQ9
2. FEELING DOWN, DEPRESSED OR HOPELESS: NOT AT ALL
SUM OF ALL RESPONSES TO PHQ9 QUESTIONS 1 AND 2: 0
1. LITTLE INTEREST OR PLEASURE IN DOING THINGS: NOT AT ALL

## 2024-05-06 ASSESSMENT — ENCOUNTER SYMPTOMS
DIZZINESS: 0
FATIGUE: 0
SHORTNESS OF BREATH: 0
PALPITATIONS: 0
HEADACHES: 1

## 2024-05-06 NOTE — OP NOTE
PREOPERATIVE DIAGNOSIS:  1. Recurrent left hip gluteus medius and gluteus minimus tear after  prior repair at an outside hospital.   2. Recalcitrant trochanteric bursitis.    POSTOPERATIVE DIAGNOSIS:    OPERATION/PROCEDURE:  1. Revision left mini open gluteal tendon repair with Achilles  allograft augmentation.   2. Trochanteric bursectomy.    SURGEON:  Antonino Tubbs MD.    ASSISTANT(S):  First assistant:  Isabel Whitten PA-C.  Please note that we will be  billing for my physician's assistant as she was critical and  necessary for successful completion of this case including limb  positioning, anchor placement, suture management, and wound closure.     Please note that we will be billing this as a 22 modifier based on  the complexity of the case requiring allograft and working for a  prior surgical bed that had a previous failed gluteal repair.     ANESTHESIA:    COMPLICATION:  None.    BLOOD LOSS:  Minimal.    INDICATION FOR PROCEDURE:  The patient is a pleasant, but unfortunate female presented to my  office with persistent lateral-sided hip pain after undergoing a  previous open gluteal tendon repair at an outside institution.  She  had persistent pain that was debilitating for her in her life and she  had a new MRI scan that revealed recurrence of her gluteal tear.  We  had spoken to her about her options including continued nonoperative  versus operative intervention.  She elected for operative  intervention after understanding the risks and benefits of surgery  which included, but not limited to, bleeding, infection, damage to  nerves or blood vessels, need for further procedure, risks of  anesthesia, blood clots, progression of osteoarthritis, incomplete  pain relief, risk of re-tear, risk of stiffness, and scarring.  The  patient understood these risks and wished to proceed with the  operative intervention.     PROCEDURE AND FINDINGS:  The patient was identified in the preoperative holding  area.  Operative extremity was marked with an indelible marker.  Informed  consent was reviewed.  She was taken to the operating room, where a  time-out was performed verifying correct site, side, procedure, and  our special equipment.  She was placed supine on the operating room  table.  All bony prominences well padded, prepped and draped in usual  sterile fashion after anesthesia induced without difficulty.  She was  placed in the lateral decubitus position prior to being prepped and  draped and SCD had been placed on the nonoperative leg for DVT  prophylaxis.  Antibiotics were infused intravenously.  An axillary  roll was placed.  All bony prominences were well padded and nerves  were protected.  We began by prepping and draping the limb in usual  sterile fashion.  We utilized her old incision extending a little bit  either end through skin and through subcutaneous tissue down to the  level of the iliotibial band which was split in line with its fibers.   We identified there was a significant amount of recalcitrant and  thickened bursa this was resected in its entirety allowing us to  visualize the patient's gluteal tendons.  There was a solitary anchor  that had been previously placed.  The sutures of this were removed.  We identified a longitudinal split within the tendon.  This was  repaired in a side-to-side fashion with 0 Vicryl suture.  We then  placed a traction suture onto the tendons and noted that she had  essentially a complete full-thickness tear of the gluteus minimus and  the majority of the gluteus medius tendon.  This tear was probed and  further identified.  We were then able to free any adhesions from  under and over surface of the tendon.  We identified the patient's  footprint and excoriated it with a combination of a curette and a  rongeur. Based on the attenuated tissue from her previous repair, we  elected to perform an allograft augmentation, so an Achilles tendon  was measured 5 cm x 5  cm.  This was then placed on top of the  patient's native tendon, held in place around the edges with 0 Vicryl  suture.  We then placed 2 triple loaded anchors as a medial row.  These were passed in a Krackow configuration into the tendon.  The  graft allowed them to incorporate together.  We then tied these down  with alternating half hitches and brought suture limbs to 2 lateral  row anchors noting excellent compression of the graft in the  patient's native tendon.  We then copiously irrigated the wounds,  closed them in a layered fashion, applied a sterile bandage and a  hinged hip brace.  The patient was awoken from anesthesia without  complication, transported to PACU in stable condition.  Postoperative  course will be standard gluteal repair protocol.       Antonino Tubbs MD    DD:  04/07/2023 22:05:14 EST  DT:  04/07/2023 23:13:04 EST  DICTATION NUMBER:  [id number]  INTERNAL JOB NUMBER:  [id number]    CC:  Antonino Tubbs MD, Fax: [phone]        Electronic Signatures:  Antonino Tubbs) (Signed on 19-Apr-2023 13:01)   Authored  Unsigned, Draft (SYS GENERATED) (Entered on 07-Apr-2023 23:13)   Entered    Last Updated: 19-Apr-2023 13:01 by Antonino Tubbs)

## 2024-05-06 NOTE — PROGRESS NOTES
"Subjective   Patient ID: Vickie Vuong is a 49 y.o. female who presents for Migraine.    HPI   Little more frequent migraine headaches but still only about once a month.  Low-dose amitriptyline helped but not as much now.  No troubles with the medication.  In the past she used Imitrex, so can start in the future if needed.  Increase amitriptyline to 50 mg at bedtime    Blood pressure is better on the medicine she feels a bit better but I do wonder if it still high enough that it could be driving her migraines.  Increase diltiazem to 180 a day.    Review of Systems   Constitutional:  Negative for fatigue.   Eyes:  Positive for visual disturbance.   Respiratory:  Negative for shortness of breath.    Cardiovascular:  Negative for chest pain and palpitations.   Neurological:  Positive for headaches. Negative for dizziness.       Objective   /88   Pulse 81   Ht 1.651 m (5' 5\")   Wt 94.8 kg (209 lb 1.6 oz)   SpO2 95%   BMI 34.80 kg/m²     Physical Exam  Constitutional:       Appearance: Normal appearance.   HENT:      Head: Normocephalic and atraumatic.   Skin:     General: Skin is warm and dry.   Neurological:      General: No focal deficit present.      Mental Status: She is alert and oriented to person, place, and time.   Psychiatric:         Mood and Affect: Mood normal.         Behavior: Behavior normal.         Thought Content: Thought content normal.         Judgment: Judgment normal.         Assessment/Plan   Problem List Items Addressed This Visit             ICD-10-CM    Hypertension, essential - Primary I10    Relevant Medications    dilTIAZem CD (Cardizem CD) 180 mg 24 hr capsule    Migraine without aura and without status migrainosus, not intractable G43.009    Relevant Medications    amitriptyline (Elavil) 50 mg tablet          "

## 2024-05-07 ENCOUNTER — PHARMACY VISIT (OUTPATIENT)
Dept: PHARMACY | Facility: CLINIC | Age: 49
End: 2024-05-07
Payer: COMMERCIAL

## 2024-05-07 ENCOUNTER — OFFICE VISIT (OUTPATIENT)
Dept: PAIN MEDICINE | Facility: CLINIC | Age: 49
End: 2024-05-07
Payer: COMMERCIAL

## 2024-05-07 VITALS
DIASTOLIC BLOOD PRESSURE: 91 MMHG | SYSTOLIC BLOOD PRESSURE: 136 MMHG | BODY MASS INDEX: 34.95 KG/M2 | WEIGHT: 210 LBS | HEART RATE: 81 BPM | RESPIRATION RATE: 16 BRPM

## 2024-05-07 DIAGNOSIS — M47.27 OSTEOARTHRITIS OF SPINE WITH RADICULOPATHY, LUMBOSACRAL REGION: ICD-10-CM

## 2024-05-07 DIAGNOSIS — M54.17 LUMBOSACRAL RADICULOPATHY: Primary | ICD-10-CM

## 2024-05-07 PROCEDURE — RXMED WILLOW AMBULATORY MEDICATION CHARGE

## 2024-05-07 PROCEDURE — 99213 OFFICE O/P EST LOW 20 MIN: CPT | Performed by: PHYSICIAN ASSISTANT

## 2024-05-07 RX ORDER — PREGABALIN 100 MG/1
CAPSULE ORAL
Qty: 90 CAPSULE | Refills: 1 | Status: SHIPPED | OUTPATIENT
Start: 2024-05-07

## 2024-05-07 ASSESSMENT — COLUMBIA-SUICIDE SEVERITY RATING SCALE - C-SSRS
1. IN THE PAST MONTH, HAVE YOU WISHED YOU WERE DEAD OR WISHED YOU COULD GO TO SLEEP AND NOT WAKE UP?: NO
6. HAVE YOU EVER DONE ANYTHING, STARTED TO DO ANYTHING, OR PREPARED TO DO ANYTHING TO END YOUR LIFE?: NO
2. HAVE YOU ACTUALLY HAD ANY THOUGHTS OF KILLING YOURSELF?: NO

## 2024-05-07 ASSESSMENT — ENCOUNTER SYMPTOMS
NUMBNESS: 1
HEMATOLOGIC/LYMPHATIC NEGATIVE: 1
ALLERGIC/IMMUNOLOGIC NEGATIVE: 1
EYES NEGATIVE: 1
CONSTITUTIONAL NEGATIVE: 1
ENDOCRINE NEGATIVE: 1
ARTHRALGIAS: 1
CARDIOVASCULAR NEGATIVE: 1
PSYCHIATRIC NEGATIVE: 1
RESPIRATORY NEGATIVE: 1
GASTROINTESTINAL NEGATIVE: 1
BACK PAIN: 1

## 2024-05-07 NOTE — PROGRESS NOTES
"Subjective   Patient ID: Vickie Vuong is a 49 y.o. female who presents for Pain (FUV for Lj low back which travels down R leg to and including toes. Pain score 3/10. Pain described as dull, \"irritating, annoying\". Activity increases pain. Nothing really helps relieve pain. She will need an Rx for lyrica if she is going to stay on it. She doesn't think is was very effective.  ANA = 18/100. ).     Connie Dominguez RN 05/07/24 3:21 PM \    Patient is a 48-year-old female.  She presents today for a follow-up after switching from gabapentin to Lyrica.   that she is using 200 bedtime.  She has not noticed any relief.  No side effects of.  She has some back and leg pain that she rates a 3/10 but she states it is affecting her ability to do certain things.  She stands a lot at work.  She wonders if maybe this is part of her problem.  She does not want to be too aggressive.  Does not want to have an injection.  She has a history of L5-S1 ALIF on 7/15/15 done by Dr. Gianni Cordova and then a posterior lumbar fusion L4-S1 in 2017 by Dr. Humza Boyd.  She wonders if she has other options to try to get some relief.        Review of Systems   Constitutional: Negative.    HENT: Negative.     Eyes: Negative.    Respiratory: Negative.     Cardiovascular: Negative.    Gastrointestinal: Negative.    Endocrine: Negative.    Genitourinary: Negative.    Musculoskeletal:  Positive for arthralgias, back pain and gait problem.   Skin: Negative.    Allergic/Immunologic: Negative.    Neurological:  Positive for numbness.   Hematological: Negative.    Psychiatric/Behavioral: Negative.         Objective   Physical Exam  Vitals and nursing note reviewed.   Constitutional:       Appearance: Normal appearance.   HENT:      Head: Normocephalic and atraumatic.      Right Ear: External ear normal.      Left Ear: External ear normal.      Nose: Nose normal.      Mouth/Throat:      Pharynx: Oropharynx is clear.   Eyes:      Conjunctiva/sclera: Conjunctivae " normal.   Cardiovascular:      Rate and Rhythm: Normal rate and regular rhythm.      Pulses: Normal pulses.   Pulmonary:      Effort: Pulmonary effort is normal.   Musculoskeletal:         General: Normal range of motion.      Cervical back: Normal range of motion.      Comments: 5/5 strength   Skin:     General: Skin is warm and dry.   Neurological:      General: No focal deficit present.      Mental Status: She is alert and oriented to person, place, and time. Mental status is at baseline.   Psychiatric:         Mood and Affect: Mood normal.         Behavior: Behavior normal.         Thought Content: Thought content normal.         Judgment: Judgment normal.         Assessment/Plan   Diagnoses and all orders for this visit:  Lumbosacral radiculopathy  -     pregabalin (Lyrica) 100 mg capsule; Take 1 in AM and 2 at bedtime  Osteoarthritis of spine with radiculopathy, lumbosacral region       Patient is a 49-year-old female with a past medical history significant for the above-mentioned medical diagnoses.  At this time, she did not notice much by the way relief from switching from gabapentin to Lyrica.  We had a long discussion about this.  We discussed injection options.  She declines.  We discussed her medications.  At this time, since she is tolerating the Lyrica we discussed increasing it.  100 mg in the morning and 200 mg at night.  How to switch was discussed.  OARRS was reviewed.  Prescription sent to the pharmacy.  Follow-up in 4 to 6 weeks.

## 2024-05-16 ENCOUNTER — PHARMACY VISIT (OUTPATIENT)
Dept: PHARMACY | Facility: CLINIC | Age: 49
End: 2024-05-16
Payer: COMMERCIAL

## 2024-05-16 DIAGNOSIS — L24.7 CONTACT DERMATITIS AND ECZEMA DUE TO PLANT: Primary | ICD-10-CM

## 2024-05-16 PROCEDURE — RXMED WILLOW AMBULATORY MEDICATION CHARGE

## 2024-05-16 RX ORDER — PREDNISONE 10 MG/1
TABLET ORAL
Qty: 30 TABLET | Refills: 2 | Status: SHIPPED | OUTPATIENT
Start: 2024-05-16 | End: 2024-05-28

## 2024-05-28 PROCEDURE — RXMED WILLOW AMBULATORY MEDICATION CHARGE

## 2024-05-30 PROCEDURE — RXMED WILLOW AMBULATORY MEDICATION CHARGE

## 2024-06-03 PROCEDURE — RXMED WILLOW AMBULATORY MEDICATION CHARGE

## 2024-06-08 ENCOUNTER — PHARMACY VISIT (OUTPATIENT)
Dept: PHARMACY | Facility: CLINIC | Age: 49
End: 2024-06-08
Payer: COMMERCIAL

## 2024-06-13 ENCOUNTER — APPOINTMENT (OUTPATIENT)
Dept: INTEGRATIVE MEDICINE | Facility: CLINIC | Age: 49
End: 2024-06-13
Payer: COMMERCIAL

## 2024-06-18 ENCOUNTER — APPOINTMENT (OUTPATIENT)
Dept: PAIN MEDICINE | Facility: CLINIC | Age: 49
End: 2024-06-18
Payer: COMMERCIAL

## 2024-07-09 ENCOUNTER — APPOINTMENT (OUTPATIENT)
Dept: INTEGRATIVE MEDICINE | Facility: CLINIC | Age: 49
End: 2024-07-09
Payer: COMMERCIAL

## 2024-07-09 DIAGNOSIS — M54.59 OTHER LOW BACK PAIN: Primary | ICD-10-CM

## 2024-07-09 PROCEDURE — 99202 OFFICE O/P NEW SF 15 MIN: CPT | Performed by: ACUPUNCTURIST

## 2024-07-09 PROCEDURE — 97810 ACUP 1/> WO ESTIM 1ST 15 MIN: CPT | Performed by: ACUPUNCTURIST

## 2024-07-09 PROCEDURE — 97811 ACUP 1/> W/O ESTIM EA ADD 15: CPT | Performed by: ACUPUNCTURIST

## 2024-07-09 NOTE — PROGRESS NOTES
Acupuncture Visit:     Subjective   Patient ID: Vickie Vuong is a 49 y.o. female who presents for No chief complaint on file.   insurance  1    States she has a lot of low back and nerve issues.  States 2 back surgeries, 1st one failed. Notes suffering a lot of nerve damage in btw surgeries.  Notes ant/post L5-S1 fusion with an allograph which she states completely dissolved, 2nd surgery (2017) (14 mos btw surgeries) was L4-5 S1 fusion  states she needs another fusion of L3 but she is hesitant,   Notes she has had PT, steroid injections (most recent Aug 2023), various meds.   Notes she is on lyrica BID to see if it helps, gabapentin prior.    States sx include RIGHT big toes always numb and tingling.  Worse with more physical activity.  Notes her limit to exercise is rowing 3x/wk, Saturday she does spin class     LBP   Notes pain is across low back worse RIGHT side.  Occ random radiation to achilles tendon if she weeds or bends over   Occ shooting pain down outer R thigh.  Sleep is good, pn not waking her nightly.     States no GB and partial hysterectomy 2016        Session Information  Is this acupuncture treatment being billed to the patient's insurance company: Yes  This is visit number: 1  The patient has a total number of visits of: 20  Initial Acupuncture Treatment date: 07/09/24  Name of Insurance Company:   Visit Type: New patient         Review of Systems         Provider reviewed plan for the acupuncture session, precautions and contraindications. Patient/guardian/hospital staff has given consent to treat with full understanding of what to expect during the session. Before acupuncture began, provider explained to the patient to communicate at any time if the procedure was causing discomfort past their tolerance level. Patient agreed to advise acupuncturist. The acupuncturist counseled the patient on the risks of acupuncture treatment including pain, infection, bleeding, and no relief of pain. The  patient was positioned comfortably. There was no evidence of infection at the site of needle insertions.    Objective   Physical Exam              Acupuncture Treatment  Body Points - Left: 22.16, 11.27 (2 near nail)  Body Points - Bilateral: Du 20, SP 6, 77.18, Lv 3, YY, UB 17, 19, 23  Body Points - Right: UB 24-27, EXB12, GB 30, 41  Other Techniques Utilized: TDP Lamp  TDP Lamp Descripton: low back with 3 moxa  Needle Count In: 25  Needle Count Out: 25  Total Face to Face Time (min): 30 minutes              Assessment/Plan

## 2024-07-10 DIAGNOSIS — M54.17 LUMBOSACRAL RADICULOPATHY: ICD-10-CM

## 2024-07-10 PROCEDURE — RXMED WILLOW AMBULATORY MEDICATION CHARGE

## 2024-07-10 RX ORDER — PREGABALIN 100 MG/1
CAPSULE ORAL
Qty: 90 CAPSULE | Refills: 1 | OUTPATIENT
Start: 2024-07-10

## 2024-07-11 ENCOUNTER — LAB (OUTPATIENT)
Dept: LAB | Facility: LAB | Age: 49
End: 2024-07-11
Payer: COMMERCIAL

## 2024-07-11 DIAGNOSIS — E28.1 ANDROGEN EXCESS: ICD-10-CM

## 2024-07-11 DIAGNOSIS — R94.7 ABNORMAL RESULTS OF OTHER ENDOCRINE FUNCTION STUDIES: Primary | ICD-10-CM

## 2024-07-11 LAB
CORTIS SERPL-MCNC: 19.7 UG/DL (ref 2.5–20)
TESTOST SERPL-MCNC: <30 NG/DL (ref 0–70)

## 2024-07-11 PROCEDURE — 84403 ASSAY OF TOTAL TESTOSTERONE: CPT

## 2024-07-11 PROCEDURE — 36415 COLL VENOUS BLD VENIPUNCTURE: CPT

## 2024-07-11 PROCEDURE — 82533 TOTAL CORTISOL: CPT

## 2024-07-13 ENCOUNTER — OFFICE VISIT (OUTPATIENT)
Dept: URGENT CARE | Facility: CLINIC | Age: 49
End: 2024-07-13
Payer: COMMERCIAL

## 2024-07-13 ENCOUNTER — PHARMACY VISIT (OUTPATIENT)
Dept: PHARMACY | Facility: CLINIC | Age: 49
End: 2024-07-13
Payer: COMMERCIAL

## 2024-07-13 VITALS
TEMPERATURE: 97.2 F | DIASTOLIC BLOOD PRESSURE: 83 MMHG | HEIGHT: 64 IN | BODY MASS INDEX: 35 KG/M2 | SYSTOLIC BLOOD PRESSURE: 119 MMHG | HEART RATE: 95 BPM | RESPIRATION RATE: 14 BRPM | WEIGHT: 205 LBS | OXYGEN SATURATION: 97 %

## 2024-07-13 DIAGNOSIS — N30.01 ACUTE CYSTITIS WITH HEMATURIA: Primary | ICD-10-CM

## 2024-07-13 LAB
POC APPEARANCE, URINE: CLEAR
POC BILIRUBIN, URINE: NEGATIVE
POC BLOOD, URINE: ABNORMAL
POC COLOR, URINE: ABNORMAL
POC GLUCOSE, URINE: NEGATIVE MG/DL
POC KETONES, URINE: NEGATIVE MG/DL
POC LEUKOCYTES, URINE: ABNORMAL
POC NITRITE,URINE: POSITIVE
POC PH, URINE: 7 PH
POC PROTEIN, URINE: NEGATIVE MG/DL
POC SPECIFIC GRAVITY, URINE: 1.01
POC UROBILINOGEN, URINE: 0.2 EU/DL

## 2024-07-13 PROCEDURE — RXMED WILLOW AMBULATORY MEDICATION CHARGE

## 2024-07-13 PROCEDURE — 99213 OFFICE O/P EST LOW 20 MIN: CPT | Performed by: NURSE PRACTITIONER

## 2024-07-13 PROCEDURE — 87086 URINE CULTURE/COLONY COUNT: CPT

## 2024-07-13 PROCEDURE — 81002 URINALYSIS NONAUTO W/O SCOPE: CPT | Performed by: NURSE PRACTITIONER

## 2024-07-13 RX ORDER — PHENAZOPYRIDINE HYDROCHLORIDE 100 MG/1
100 TABLET, FILM COATED ORAL 3 TIMES DAILY PRN
Qty: 10 TABLET | Refills: 0 | Status: SHIPPED | OUTPATIENT
Start: 2024-07-13 | End: 2024-07-17

## 2024-07-13 RX ORDER — CIPROFLOXACIN 500 MG/1
500 TABLET ORAL 2 TIMES DAILY
Qty: 14 TABLET | Refills: 0 | Status: SHIPPED | OUTPATIENT
Start: 2024-07-13 | End: 2024-07-20

## 2024-07-13 RX ORDER — FLUCONAZOLE 150 MG/1
150 TABLET ORAL ONCE
Qty: 1 TABLET | Refills: 0 | Status: SHIPPED | OUTPATIENT
Start: 2024-07-13 | End: 2024-07-14

## 2024-07-13 NOTE — PROGRESS NOTES
49 y.o. female presents for evaluation of dysuria. Patient reports several days of increased urinary frequency, mild suprapubic discomfort. Patient denies fever, nausea, vomiting, vaginal discharge, flank pains, or other constitutional signs and symptoms. No recent UTI. No other complaints.      Vitals:    07/13/24 1004   BP: 119/83   Pulse: 95   Resp: 14   Temp: 36.2 °C (97.2 °F)   SpO2: 97%       Allergies   Allergen Reactions    Morphine Hallucinations and Other     hallucinations       Medication Documentation Review Audit       Reviewed by Siobhan Vela MA (Medical Assistant) on 07/13/24 at 1003      Medication Order Taking? Sig Documenting Provider Last Dose Status   amitriptyline (Elavil) 50 mg tablet [id number] Yes Take 1 tablet (50 mg) by mouth once daily at bedtime. Ollie Dietz MD Taking Active   celecoxib (CeleBREX) 200 mg capsule [id number] Yes TAKE 1 CAPSULE (200 MG) BY MOUTH 2 TIMES A DAY. Ollie Dietz MD Taking Active   dilTIAZem CD (Cardizem CD) 180 mg 24 hr capsule [id number] Yes Take 1 capsule (180 mg) by mouth once daily. Ollie Dietz MD Taking Active   ergocalciferol (Vitamin D2) 1.25 MG (82610 UT) capsule [id number] Yes Take 1 capsule (1,250 mcg) by mouth 1 (one) time per week.  Taking Active   montelukast (Singulair) 10 mg tablet [id number] Yes TAKE 1 TABLET BY MOUTH EVERY NIGHT Ollie Dietz MD Taking Active   pregabalin (Lyrica) 100 mg capsule [id number] Yes Take 1 in AM and 2 at bedtime Maria Del Carmen Luong PA-C Taking Active   pregabalin (Lyrica) 200 mg capsule [id number]  Take 1 capsule (200 mg) by mouth once daily at bedtime. Maria Del Carmen Luong PA-C  Active                    Past Medical History:   Diagnosis Date    Personal history of other medical treatment 08/30/2022    History of mammogram       Past Surgical History:   Procedure Laterality Date    FL GUIDED INJECTION HIP LEFT Left 11/21/2023    FL GUIDED INJECTION HIP LEFT 11/21/2023 [facility]    HIP SURGERY Left      Lt Flex muscle    OTHER SURGICAL HISTORY  10/24/2019    Back surgery    OTHER SURGICAL HISTORY  10/24/2019    Hysterectomy    OTHER SURGICAL HISTORY  10/24/2019    Appendectomy    OTHER SURGICAL HISTORY  10/24/2019    Cholecystectomy    OTHER SURGICAL HISTORY  10/02/2020    Epidural steroid injection       ROS  See HPI    Physical Exam  Vitals and nursing note reviewed.   Constitutional:       General: She is not in acute distress.     Appearance: Normal appearance. She is not ill-appearing or toxic-appearing.   Cardiovascular:      Rate and Rhythm: Normal rate and regular rhythm.   Abdominal:      General: There is no distension.      Palpations: Abdomen is soft.      Tenderness: There is abdominal tenderness in the suprapubic area. There is no right CVA tenderness, left CVA tenderness, guarding or rebound.   Genitourinary:     General: Normal vulva.      Vagina: No vaginal discharge.      Comments: Per pt report  Skin:     General: Skin is warm and dry.   Neurological:      General: No focal deficit present.      Mental Status: She is alert and oriented to person, place, and time.   Psychiatric:         Mood and Affect: Mood normal.         Behavior: Behavior normal.       Recent Results (from the past 1 hour(s))   POCT UA (nonautomated w/o microscopy) manually resulted    Collection Time: 07/13/24 10:12 AM   Result Value Ref Range    POC Color, Urine Orange (A) Straw, Yellow, Light-Yellow    POC Appearance, Urine Clear Clear    POC Glucose, Urine NEGATIVE NEGATIVE mg/dl    POC Bilirubin, Urine NEGATIVE NEGATIVE    POC Ketones, Urine NEGATIVE NEGATIVE mg/dl    POC Specific Gravity, Urine 1.015 1.005 - 1.035    POC Blood, Urine TRACE-Intact (A) NEGATIVE    POC PH, Urine 7.0 No Reference Range Established PH    POC Protein, Urine NEGATIVE NEGATIVE, 30 (1+) mg/dl    POC Urobilinogen, Urine 0.2 0.2, 1.0 EU/DL    Poc Nitrite, Urine POSITIVE (A) NEGATIVE    POC Leukocytes, Urine LARGE (3+) (A) NEGATIVE          Assessment/Plan/MDM  Vickie was seen today for uti.  Diagnoses and all orders for this visit:  Acute cystitis with hematuria (Primary)  -     POCT UA (nonautomated w/o microscopy) manually resulted  -     ciprofloxacin (Cipro) 500 mg tablet; Take 1 tablet (500 mg) by mouth 2 times a day for 7 days.  -     Urine Culture    Encouraged patient increase water intake, avoid caffeine/energy drinks, void after intercourse, wipe front to back after voiding and bowel movements, avoid baths/hot tubs/pools, avoid tight fitting garments, empty bladder frequently. Patient's clinical presentation is otherwise unremarkable at this time. Patient is discharged with instructions to follow-up with primary care or seek emergency medical attention for worsening symptoms or any new concerns.    I did personally review Vickie's past medical history, surgical history, social history, as well as family history (when relevant).  In this case, I also oversaw the her drug management by reviewing her medication list, allergy list, as well as the medications that I prescribed during the UC course and/or recommended as an out-patient (including possible OTC medications such as acetaminophen, NSAIDs , etc).    After reviewing the items above, I did look at previous medical documentation, such as recent hospitalizations, office visits, and/or recent consultations with PCP/specialist.                          SDOH:   Another factor that I considered in Vickie's care was her Social Determinants of Health (SDOH). During this UC encounter, she did not have social determinants of health. Those SDOH influencing Vickie's care are: none      Tavares Maza CNP  [facility]  [phone]

## 2024-07-16 ENCOUNTER — DOCUMENTATION (OUTPATIENT)
Dept: URGENT CARE | Facility: CLINIC | Age: 49
End: 2024-07-16
Payer: COMMERCIAL

## 2024-07-16 DIAGNOSIS — G43.009 MIGRAINE WITHOUT AURA AND WITHOUT STATUS MIGRAINOSUS, NOT INTRACTABLE: Primary | ICD-10-CM

## 2024-07-16 DIAGNOSIS — N30.01 ACUTE CYSTITIS WITH HEMATURIA: Primary | ICD-10-CM

## 2024-07-16 LAB — BACTERIA UR CULT: ABNORMAL

## 2024-07-16 PROCEDURE — RXMED WILLOW AMBULATORY MEDICATION CHARGE

## 2024-07-16 RX ORDER — SUMATRIPTAN SUCCINATE 100 MG/1
100 TABLET ORAL ONCE AS NEEDED
Qty: 9 TABLET | Refills: 5 | Status: SHIPPED | OUTPATIENT
Start: 2024-07-16 | End: 2025-07-16

## 2024-07-16 RX ORDER — NITROFURANTOIN 25; 75 MG/1; MG/1
100 CAPSULE ORAL 2 TIMES DAILY
Qty: 14 CAPSULE | Refills: 0 | Status: SHIPPED | OUTPATIENT
Start: 2024-07-16 | End: 2024-07-24

## 2024-07-16 RX ORDER — TOPIRAMATE 25 MG/1
25 TABLET ORAL NIGHTLY
Qty: 30 TABLET | Refills: 1 | Status: SHIPPED | OUTPATIENT
Start: 2024-07-16 | End: 2024-09-14

## 2024-07-16 NOTE — PROGRESS NOTES
Pt notified of urine culture results. Advised she needs do discontinue Cipro and begin Macrobid. She verbalized understanding and rx was sent to her pharmacy.     Vickie was seen today for results.  Diagnoses and all orders for this visit:  Acute cystitis with hematuria (Primary)  -     nitrofurantoin, macrocrystal-monohydrate, (Macrobid) 100 mg capsule; Take 1 capsule (100 mg) by mouth 2 times a day for 7 days.       -c/w heparin subq tid. -c/w heparin subq tid. -c/w heparin subq tid. -no evidence of withdrawal at this time, will monitor -c/w heparin subq tid. -c/w heparin subq tid. VTE PPX with ASA.  d/w ortho  d/w RN  plan d/w patient VTE PPX with ASA.  d/w ortho  d/w RN  plan d/w patient VTE PPX with ASA.  d/w ortho  d/w RN  plan d/w patient

## 2024-07-17 ENCOUNTER — PHARMACY VISIT (OUTPATIENT)
Dept: PHARMACY | Facility: CLINIC | Age: 49
End: 2024-07-17
Payer: COMMERCIAL

## 2024-07-17 DIAGNOSIS — M54.17 LUMBOSACRAL RADICULOPATHY: ICD-10-CM

## 2024-07-17 PROCEDURE — RXMED WILLOW AMBULATORY MEDICATION CHARGE

## 2024-07-17 RX ORDER — PREGABALIN 100 MG/1
CAPSULE ORAL
Qty: 90 CAPSULE | Refills: 1 | Status: SHIPPED | OUTPATIENT
Start: 2024-07-17

## 2024-07-25 ENCOUNTER — APPOINTMENT (OUTPATIENT)
Dept: OBSTETRICS AND GYNECOLOGY | Facility: CLINIC | Age: 49
End: 2024-07-25
Payer: COMMERCIAL

## 2024-07-25 VITALS
BODY MASS INDEX: 35.3 KG/M2 | HEIGHT: 64 IN | SYSTOLIC BLOOD PRESSURE: 118 MMHG | DIASTOLIC BLOOD PRESSURE: 84 MMHG | WEIGHT: 206.8 LBS

## 2024-07-25 DIAGNOSIS — Z00.00 ROUTINE ADULT HEALTH MAINTENANCE: Primary | ICD-10-CM

## 2024-07-25 PROCEDURE — 3074F SYST BP LT 130 MM HG: CPT | Performed by: REGISTERED NURSE

## 2024-07-25 PROCEDURE — 99386 PREV VISIT NEW AGE 40-64: CPT | Performed by: REGISTERED NURSE

## 2024-07-25 PROCEDURE — 1036F TOBACCO NON-USER: CPT | Performed by: REGISTERED NURSE

## 2024-07-25 PROCEDURE — 3079F DIAST BP 80-89 MM HG: CPT | Performed by: REGISTERED NURSE

## 2024-07-25 PROCEDURE — 3008F BODY MASS INDEX DOCD: CPT | Performed by: REGISTERED NURSE

## 2024-07-25 ASSESSMENT — PAIN SCALES - GENERAL: PAINLEVEL: 0-NO PAIN

## 2024-07-25 ASSESSMENT — ENCOUNTER SYMPTOMS
CONSTITUTIONAL NEGATIVE: 1
PSYCHIATRIC NEGATIVE: 1

## 2024-07-25 NOTE — PROGRESS NOTES
Subjective   Patient ID: Vickie Vuong is a 49 y.o. female who presents for Annual Exam (Patient is here for yearly exam and pap test. Patient does not do regular self breast exams and has no concerns at this time. LMP: hysterectomy /).  HPI  Pt. Presents for annual exam. Put in own order for mammogram. Hyst due to persistent pelvic discomfort without definitive diagnosis in 2017 per pt. No pap since that time. Pt. Believes cervix is in place and notes that ovaries are in place. Denies vasomotor sx, states she had hormonal testing done elswhere that confirmed menopause. Denies any vaginal symptoms, does confirm bladder leaking with activity. Declines referral to pelvic PT at this time. Pt. Denies any other complaints or concerns  Review of Systems   Constitutional: Negative.    Genitourinary: Negative.    Psychiatric/Behavioral: Negative.         Objective   Physical Exam  Constitutional:       Appearance: Normal appearance.   Pulmonary:      Effort: Pulmonary effort is normal.   Genitourinary:     Comments: No cervix identified on bimanual or speculum exam  Neurological:      General: No focal deficit present.      Mental Status: She is alert and oriented to person, place, and time.   Psychiatric:         Mood and Affect: Mood normal.         Behavior: Behavior normal.         Assessment/Plan        Pt. Advised to obtain surgical records. If cervix is in fact present we will take another look and obtain a pap, otherwise pap not indicated  RTO prn    Stacy Aguilar, HARITHA-DON, HARITHA-CNP, DNP 07/25/24 3:57 PM

## 2024-07-30 ENCOUNTER — OFFICE VISIT (OUTPATIENT)
Dept: PAIN MEDICINE | Facility: CLINIC | Age: 49
End: 2024-07-30
Payer: COMMERCIAL

## 2024-07-30 VITALS — SYSTOLIC BLOOD PRESSURE: 127 MMHG | DIASTOLIC BLOOD PRESSURE: 76 MMHG | HEART RATE: 102 BPM

## 2024-07-30 DIAGNOSIS — M54.17 LUMBOSACRAL RADICULOPATHY: Primary | ICD-10-CM

## 2024-07-30 DIAGNOSIS — M47.27 OSTEOARTHRITIS OF SPINE WITH RADICULOPATHY, LUMBOSACRAL REGION: ICD-10-CM

## 2024-07-30 PROCEDURE — 99213 OFFICE O/P EST LOW 20 MIN: CPT | Performed by: PHYSICIAN ASSISTANT

## 2024-07-30 ASSESSMENT — ENCOUNTER SYMPTOMS
WEAKNESS: 1
ENDOCRINE NEGATIVE: 1
ALLERGIC/IMMUNOLOGIC NEGATIVE: 1
HEMATOLOGIC/LYMPHATIC NEGATIVE: 1
RESPIRATORY NEGATIVE: 1
BACK PAIN: 1
MYALGIAS: 1
ARTHRALGIAS: 1
CARDIOVASCULAR NEGATIVE: 1
NUMBNESS: 1
GASTROINTESTINAL NEGATIVE: 1
EYES NEGATIVE: 1
PSYCHIATRIC NEGATIVE: 1
CONSTITUTIONAL NEGATIVE: 1

## 2024-07-30 NOTE — PROGRESS NOTES
Subjective   Patient ID: Vickie Vuong is a 49 y.o. female who presents for Med Management (FOLLOW UP ON LYRICA, SHE IS STILL HAVING A LOT OF NUMBNESS AND TINGLING IN HER TOES WHERE SHE MORE CONTROLLED WITH THE SYMPTOMS ON GABAPENTIN, SHE CONTINUES TO TAKE HER MEDICATIONS DAILY).PAIN SCORE 1/10, ETOH NO, SOAPP=1, ANA=16%    Marley Shaikh, [facility] 07/30/24 7:49 AM     Patient is a 49-year-old female.  She presents today for a follow-up after increasing her gabapentin.  She is on 100 mg in the morning and 200 mg at night.  She has some numbness and tingling in her legs but she rates it a 1/10 at this time.  She states that it is not perfect but it is overall tolerable.  No side effects of.  She notices more numbness and tingling the more she does.  If she has a day where she is just resting a lot she does not have any symptoms at all but if she is doing a lot of yard work or walking a lot she has the numbness improved.  She does not want to have any injection.  She has a history of L5-S1 ALIF on 7/15/15 done by Dr. Gianni Cordova and then a posterior lumbar fusion L4-S1 in 2017 by Dr. Humza Boyd.          Review of Systems   Constitutional: Negative.    HENT: Negative.     Eyes: Negative.    Respiratory: Negative.     Cardiovascular: Negative.    Gastrointestinal: Negative.    Endocrine: Negative.    Genitourinary: Negative.    Musculoskeletal:  Positive for arthralgias, back pain and myalgias.   Skin: Negative.    Allergic/Immunologic: Negative.    Neurological:  Positive for weakness and numbness.   Hematological: Negative.    Psychiatric/Behavioral: Negative.         Objective   Physical Exam  Vitals and nursing note reviewed.   Constitutional:       General: She is not in acute distress.     Appearance: Normal appearance. She is not ill-appearing.   HENT:      Head: Normocephalic and atraumatic.      Right Ear: External ear normal.      Left Ear: External ear normal.      Nose: Nose normal.      Mouth/Throat:      Pharynx:  Oropharynx is clear.   Eyes:      Conjunctiva/sclera: Conjunctivae normal.   Cardiovascular:      Rate and Rhythm: Normal rate and regular rhythm.      Pulses: Normal pulses.   Pulmonary:      Effort: Pulmonary effort is normal.      Breath sounds: Normal breath sounds.   Musculoskeletal:         General: Normal range of motion.      Cervical back: Normal range of motion.   Skin:     General: Skin is warm and dry.   Neurological:      General: No focal deficit present.      Mental Status: She is alert and oriented to person, place, and time. Mental status is at baseline.   Psychiatric:         Mood and Affect: Mood normal.         Behavior: Behavior normal.         Thought Content: Thought content normal.         Judgment: Judgment normal.         Assessment/Plan   Diagnoses and all orders for this visit:  Lumbosacral radiculopathy  Osteoarthritis of spine with radiculopathy, lumbosacral region       Patient is a 49-year-old female with the above-mentioned medical diagnoses following up after increasing her Lyrica.  She is using 100 mg in the morning and 200mg at night.  She is feeling well.  She is doing well.  She has some numbness and tingling but she states that if she has a day where she does not do a lot she does not have any symptoms.  At this time, she is going to increase the medication.  She does not want to have any injections and she feels at this time, things are not perfect but they are overall stable and she would like to keep the medication how it is.  She is going to follow-up in 6 months per her request.  OARRS reviewed.

## 2024-07-31 ENCOUNTER — HOSPITAL ENCOUNTER (OUTPATIENT)
Dept: RADIOLOGY | Facility: CLINIC | Age: 49
Discharge: HOME | End: 2024-07-31
Payer: COMMERCIAL

## 2024-07-31 VITALS — BODY MASS INDEX: 35.17 KG/M2 | HEIGHT: 64 IN | WEIGHT: 206 LBS

## 2024-07-31 DIAGNOSIS — Z12.31 SCREENING MAMMOGRAM FOR BREAST CANCER: ICD-10-CM

## 2024-07-31 PROCEDURE — 77063 BREAST TOMOSYNTHESIS BI: CPT | Performed by: RADIOLOGY

## 2024-07-31 PROCEDURE — 77067 SCR MAMMO BI INCL CAD: CPT | Performed by: RADIOLOGY

## 2024-07-31 PROCEDURE — 77067 SCR MAMMO BI INCL CAD: CPT

## 2024-08-03 ENCOUNTER — OFFICE VISIT (OUTPATIENT)
Dept: URGENT CARE | Facility: CLINIC | Age: 49
End: 2024-08-03
Payer: COMMERCIAL

## 2024-08-03 ENCOUNTER — PHARMACY VISIT (OUTPATIENT)
Dept: PHARMACY | Facility: CLINIC | Age: 49
End: 2024-08-03
Payer: COMMERCIAL

## 2024-08-03 VITALS
BODY MASS INDEX: 35.17 KG/M2 | TEMPERATURE: 97.6 F | HEIGHT: 64 IN | RESPIRATION RATE: 16 BRPM | WEIGHT: 206 LBS | SYSTOLIC BLOOD PRESSURE: 130 MMHG | HEART RATE: 92 BPM | DIASTOLIC BLOOD PRESSURE: 85 MMHG | OXYGEN SATURATION: 98 %

## 2024-08-03 DIAGNOSIS — R30.0 DYSURIA: Primary | ICD-10-CM

## 2024-08-03 LAB
POC APPEARANCE, URINE: ABNORMAL
POC BILIRUBIN, URINE: NEGATIVE
POC BLOOD, URINE: ABNORMAL
POC COLOR, URINE: YELLOW
POC GLUCOSE, URINE: NEGATIVE MG/DL
POC KETONES, URINE: NEGATIVE MG/DL
POC LEUKOCYTES, URINE: ABNORMAL
POC NITRITE,URINE: POSITIVE
POC PH, URINE: 7 PH
POC PROTEIN, URINE: NEGATIVE MG/DL
POC SPECIFIC GRAVITY, URINE: 1.01
POC UROBILINOGEN, URINE: 0.2 EU/DL

## 2024-08-03 PROCEDURE — 81003 URINALYSIS AUTO W/O SCOPE: CPT | Mod: QW | Performed by: NURSE PRACTITIONER

## 2024-08-03 PROCEDURE — 87186 SC STD MICRODIL/AGAR DIL: CPT

## 2024-08-03 PROCEDURE — 87086 URINE CULTURE/COLONY COUNT: CPT

## 2024-08-03 PROCEDURE — RXMED WILLOW AMBULATORY MEDICATION CHARGE

## 2024-08-03 PROCEDURE — 99213 OFFICE O/P EST LOW 20 MIN: CPT

## 2024-08-03 PROCEDURE — 81003 URINALYSIS AUTO W/O SCOPE: CPT

## 2024-08-03 RX ORDER — AMOXICILLIN AND CLAVULANATE POTASSIUM 875; 125 MG/1; MG/1
1 TABLET, FILM COATED ORAL 2 TIMES DAILY
Qty: 20 TABLET | Refills: 0 | Status: SHIPPED | OUTPATIENT
Start: 2024-08-03 | End: 2024-08-13

## 2024-08-03 RX ORDER — FLUCONAZOLE 150 MG/1
150 TABLET ORAL ONCE
Qty: 1 TABLET | Refills: 0 | Status: SHIPPED | OUTPATIENT
Start: 2024-08-03 | End: 2024-08-04

## 2024-08-03 NOTE — PROGRESS NOTES
[facility]  hCelsea FUENTES Binh, APRN-CNP     Visit Note - 8/3/2024 9:43 AM   This note was generated with voice recognition software and may contain errors including spelling, grammar, syntax, and misrecognization of what was dictated.    Patient: Vickie Vuong, MRN: [id number], 49 y.o., female   PCP: Ollie Dietz MD  ------------------------------------  ALLERGIES:   Allergies   Allergen Reactions    Morphine Hallucinations and Other     hallucinations        CURRENT MEDICATIONS:   Current Outpatient Medications   Medication Instructions    amitriptyline (ELAVIL) 50 mg, oral, Nightly    amoxicillin-pot clavulanate (Augmentin) 875-125 mg tablet 1 tablet, oral, 2 times daily, Take with a meal.    celecoxib (CeleBREX) 200 mg capsule TAKE 1 CAPSULE (200 MG) BY MOUTH 2 TIMES A DAY.    dilTIAZem CD (CARDIZEM CD) 180 mg, oral, Daily    ergocalciferol (VITAMIN D2) 1,250 mcg, oral, Once Weekly    fluconazole (DIFLUCAN) 150 mg, oral, Once, If any s/sxs of yeast infection develop.    montelukast (Singulair) 10 mg tablet TAKE 1 TABLET BY MOUTH EVERY NIGHT    pregabalin (Lyrica) 100 mg capsule Take 1 capsule in AM and 2 capsules at bedtime    SUMAtriptan (IMITREX) 100 mg, oral, Once as needed, May repeat after 2 hours.    topiramate (TOPAMAX) 25 mg, oral, Nightly     ------------------------------------  PAST MEDICAL HX:  Patient Active Problem List   Diagnosis    Anxiety    Diverticulitis of colon    Diverticulosis of colon    Hypertension, essential    Lumbosacral radiculopathy    Insomnia, persistent    Lumbosacral spondylosis    Mild persistent asthma without complication ([facility])    Obesity    Migraine without aura and without status migrainosus, not intractable      SURGICAL HX:  Past Surgical History:   Procedure Laterality Date    FL GUIDED INJECTION HIP LEFT Left 11/21/2023    FL GUIDED INJECTION HIP LEFT 11/21/2023 [facility]    HIP SURGERY Left     Lt Flex muscle    OTHER SURGICAL HISTORY   "10/24/2019    Back surgery    OTHER SURGICAL HISTORY  10/24/2019    Hysterectomy    OTHER SURGICAL HISTORY  10/24/2019    Appendectomy    OTHER SURGICAL HISTORY  10/24/2019    Cholecystectomy    OTHER SURGICAL HISTORY  10/02/2020    Epidural steroid injection      FAMILY HX:   No pertinent history.   SOCIAL HX:    reports that she has never smoked. She has never used smokeless tobacco. .   ------------------------------------  CHIEF COMPLAINT:   Chief Complaint   Patient presents with    UTI     BAD SMELL OF URINE, URGENCY OFF AND ON X 5-6 DAYS      HISTORY OF PRESENT ILLNESS: The history was obtained from patient. Vickie is a 49 y.o. female, who presents with a chief complaint of urinary urgency and foul smell of urine - sxs started 5-7 days ago. She denies any fever/chills, urinary frequency, dysuria, blood in urine, malaise, lethargy, nausea/vomiting, bloating, abdominal distension/\"pain,\" back/flank pain, change in bowel habits, and vaginal symptoms - reports she has a slight vaginal discharge (thin, cloudy, no odor) but it does not seem to be bothersome. Denies any changes in mental status. Patient was seen on 7/13 for similar UTI symptoms - was started on Cipro + 1 dose of Diflucan but was switched to Macrobid after culture results were received - reports she finished medication as directed, but now is concerned she might have another UTI again. Has not tried any OTC medications or conservative measures for her symptoms. Reports she recently had a pelvic exam - no issues were noted. Is  - denies any concern for STDs.     REVIEW OF SYSTEMS:  10 systems reviewed negative with exception of history of present illness as listed above.    TODAY'S VITALS: /85   Pulse 92   Temp 36.4 °C (97.6 °F)   Resp 16   Ht 1.626 m (5' 4\")   Wt 93.4 kg (206 lb)   SpO2 98%   BMI 35.36 kg/m²     PHYSICAL EXAMINATION:  General: Pleasant, well-nourished female; alert and oriented, in no acute distress. " Sitting comfortably on exam table.    Eyes:  Pupils equal, round and reactive to light. Non-icteric; conjunctiva clear.   HENT: Normocephalic. Oral mucosa moist.   Neck:  Supple. No lymphadenopathy.  Respiratory:  Lungs are clear to auscultation; no wheezes, rhonchi, or rales. Respirations unlabored, Breath sounds are equal, Symmetrical chest wall expansion.  Cardiovascular:  Regular rate, Regular rhythm. Normal S1S2. No m/r/g.  Gastrointestinal:  Soft, non-tender, non-distended; no palpable masses or organomegaly. Bowel sounds normoactive. No CVA tenderness.  Gynecologic: Patient requests to defer exam  Musculoskeletal:  Grossly normal; appropriate for age.     Integumentary:  Pink, warm, dry, and intact. No rashes or skin discoloration appreciated. Good skin turgor.  Neurologic:  Alert and oriented; grossly intact.    Cognition and Speech:  Oriented, Speech clear and coherent.    Psychiatric:  Cooperative, Appropriate mood & affect.       ------------------------------------  Medical Decision Making  LABORATORY or RADIOLOGICAL IMAGING ORDERS/RESULTS:   Recent Results (from the past 24 hour(s))   POCT UA Automated manually resulted    Collection Time: 08/03/24  9:44 AM   Result Value Ref Range    POC Color, Urine Yellow Straw, Yellow, Light-Yellow    POC Appearance, Urine Cloudy (A) Clear    POC Glucose, Urine NEGATIVE NEGATIVE mg/dl    POC Bilirubin, Urine NEGATIVE NEGATIVE    POC Ketones, Urine NEGATIVE NEGATIVE mg/dl    POC Specific Gravity, Urine 1.015 1.005 - 1.035    POC Blood, Urine TRACE-Intact (A) NEGATIVE    POC PH, Urine 7.0 No Reference Range Established PH    POC Protein, Urine NEGATIVE NEGATIVE, 30 (1+) mg/dl    POC Urobilinogen, Urine 0.2 0.2, 1.0 EU/DL    Poc Nitrite, Urine POSITIVE (A) NEGATIVE    POC Leukocytes, Urine SMALL (1+) (A) NEGATIVE       IMPRESSION/PLAN:  Course: Worsening; stable    1. Dysuria  - POCT UA Automated manually resulted  - Urine Culture  - amoxicillin-pot clavulanate  (Augmentin) 875-125 mg tablet; Take 1 tablet by mouth 2 times a day for 10 days. Take with a meal.  Dispense: 20 tablet; Refill: 0  - fluconazole (Diflucan) 150 mg tablet; Take 1 tablet (150 mg) by mouth 1 time for 1 dose. If any s/sxs of yeast infection develop.  Dispense: 1 tablet; Refill: 0    Symptoms/exam consistent with UTI, although reviewed other potential etiologies. She declines BV/yeast testing and pelvic exam today-  understands will need to follow up with PCP/GYN for further evaluation if symptoms persist/worsen. No CVA tenderness, is afebrile, and no other red flags on exam. Was recently on Macrobid. Reviewed most recent urine culture result - showed resistance to Cipro and Bactrim. Patient will be discharged home with oral antibiotics - Augmentin; single dose of Diflucan also provided, per pt's request. Encouraged probiotic. Instructed to begin antibiotic ASAP (reviewed expectations and common side effects of treatment), and complete full course of medication, even if symptoms resolve more quickly. Also advised to push fluids, rest, and to use appropriate over the counter medications for management of symptoms - cranberry juice/pills and pyridium may be helpful. Advised to follow-up with primary care provider or ER ASAP if develops fever/chills, body aches, malaise, back/abdominal pain, nausea/vomiting, mental status changes, or if additional concerns/red flags develop. Reviewed strategies for UTI prevention in the future. Patient agreed with plan of care; questions were encouraged and answered.    Sending urine for culture to ensure appropriate antibiotic coverage - will contact with results/recommendations.       HARITHA White-CNP   Advanced Practice Provider  [facility]

## 2024-08-06 ENCOUNTER — TELEPHONE (OUTPATIENT)
Dept: URGENT CARE | Facility: CLINIC | Age: 49
End: 2024-08-06
Payer: COMMERCIAL

## 2024-08-06 LAB — BACTERIA UR CULT: ABNORMAL

## 2024-08-06 PROCEDURE — RXMED WILLOW AMBULATORY MEDICATION CHARGE

## 2024-08-06 NOTE — TELEPHONE ENCOUNTER
CALLED PATIENT WITH RESULTS INFORMED HER THAT THE URINE CULTURE DID SHOW GROWTH AND THAT THE MEDICATION SHE IS ON IS THE RIGHT MEDICATION. ASKED HOW SHE WAS FEELING, SHE SAID SHE WAS FEELING BETTER. SO INSTRUCTED HER TO CONTINUE THE MEDICATION.

## 2024-08-13 PROCEDURE — RXMED WILLOW AMBULATORY MEDICATION CHARGE

## 2024-08-14 ENCOUNTER — APPOINTMENT (OUTPATIENT)
Dept: INTEGRATIVE MEDICINE | Facility: CLINIC | Age: 49
End: 2024-08-14
Payer: COMMERCIAL

## 2024-08-14 DIAGNOSIS — M54.59 OTHER LOW BACK PAIN: Primary | ICD-10-CM

## 2024-08-14 PROCEDURE — 97810 ACUP 1/> WO ESTIM 1ST 15 MIN: CPT | Performed by: ACUPUNCTURIST

## 2024-08-14 PROCEDURE — 97811 ACUP 1/> W/O ESTIM EA ADD 15: CPT | Performed by: ACUPUNCTURIST

## 2024-08-14 NOTE — PROGRESS NOTES
Acupuncture Visit:     Please note: Dictation software was used while completing this note and may contain spelling, grammatical, and/or syntax errors.  Please contact me with any questions.    To clinicians, I am always happy to partner with you in the care of your patients. Do not hesitate to call the office or contact me directly regarding any further consultations or with questions regarding the plan of care outlined or patient progress.    Subjective   Patient ID: Vickie Vuong is a 49 y.o. female who presents for Back Pain    Insurance visit 2 of 20    Patient continues to report right sided low back pain, with some referral into her glutes      Initial intake (07/09/2024 )    States she has a lot of low back and nerve issues.  States 2 back surgeries, 1st one failed. Notes suffering a lot of nerve damage in btw surgeries.  Notes ant/post L5-S1 fusion with an allograph which she states completely dissolved, 2nd surgery (2017) (14 mos btw surgeries) was L4-5 S1 fusion  states she needs another fusion of L3 but she is hesitant,   Notes she has had PT, steroid injections (most recent Aug 2023), various meds.   Notes she is on lyrica BID to see if it helps, gabapentin prior.    States sx include RIGHT big toes always numb and tingling.  Worse with more physical activity.  Notes her limit to exercise is rowing 3x/wk, Saturday she does spin class     LBP   Notes pain is across low back worse RIGHT side.  Occ random radiation to achilles tendon if she weeds or bends over   Occ shooting pain down outer R thigh.  Sleep is good, pn not waking her nightly.     States no GB and partial hysterectomy 2016        Session Information  Is this acupuncture treatment being billed to the patient's insurance company: Yes  This is visit number: 2  The patient has a total number of visits of: 20  Initial Acupuncture Treatment date: 07/09/24  Last Treatment date: 07/09/24  Name of Insurance Company:  Employee Medical Plan  Visit  Type: Follow-up visit  Medical History Reviewed: I have reviewed pertinent medical history in EHR, and no contraindications are present to provide treatment  Description of present complaint: Chronic pain, Myofascial pain, Muscle tension         Review of Systems         Provider reviewed plan for the acupuncture session, precautions and contraindications. Patient/guardian/hospital staff has given consent to treat with full understanding of what to expect during the session. Before acupuncture began, provider explained to the patient to communicate at any time if the procedure was causing discomfort past their tolerance level. Patient agreed to advise acupuncturist. The acupuncturist counseled the patient on the risks of acupuncture treatment including pain, infection, bleeding, and no relief of pain. The patient was positioned comfortably. There was no evidence of infection at the site of needle insertions.    Objective   Physical Exam         Treatment Plan  Treatment Goals: Pain management, Wellbeing improvement    Acupuncture Treatment  Patient Position: Prone on a table  Needle Guage: 36 guage /.20/ Blue seirin, 30 guage /.30/ Brown seirin  Body Points - Bilateral: KD7, BL23, BL52  Body Points - Right: GB34, BL24, BL25, BL26, QL, glute med, piriformis  Other Techniques Utilized: Topicals  Topicals Description: Posumon oil  Needle Count In: 13  Needle Count Out: 13  Needle Retention Time (min): 25 minutes  Total Face to Face Time (min): 20 minutes              Assessment/Plan

## 2024-08-19 ENCOUNTER — PHARMACY VISIT (OUTPATIENT)
Dept: PHARMACY | Facility: CLINIC | Age: 49
End: 2024-08-19
Payer: COMMERCIAL

## 2024-08-20 ENCOUNTER — PHARMACY VISIT (OUTPATIENT)
Dept: PHARMACY | Facility: CLINIC | Age: 49
End: 2024-08-20
Payer: COMMERCIAL

## 2024-08-20 ENCOUNTER — OFFICE VISIT (OUTPATIENT)
Dept: URGENT CARE | Facility: CLINIC | Age: 49
End: 2024-08-20
Payer: COMMERCIAL

## 2024-08-20 VITALS
BODY MASS INDEX: 33.94 KG/M2 | DIASTOLIC BLOOD PRESSURE: 85 MMHG | WEIGHT: 198.8 LBS | SYSTOLIC BLOOD PRESSURE: 134 MMHG | TEMPERATURE: 97.3 F | HEART RATE: 77 BPM | OXYGEN SATURATION: 99 % | HEIGHT: 64 IN | RESPIRATION RATE: 16 BRPM

## 2024-08-20 DIAGNOSIS — N30.00 ACUTE CYSTITIS WITHOUT HEMATURIA: Primary | ICD-10-CM

## 2024-08-20 PROCEDURE — 87661 TRICHOMONAS VAGINALIS AMPLIF: CPT

## 2024-08-20 PROCEDURE — 87491 CHLMYD TRACH DNA AMP PROBE: CPT

## 2024-08-20 PROCEDURE — 87086 URINE CULTURE/COLONY COUNT: CPT

## 2024-08-20 PROCEDURE — RXMED WILLOW AMBULATORY MEDICATION CHARGE

## 2024-08-20 PROCEDURE — 87591 N.GONORRHOEAE DNA AMP PROB: CPT

## 2024-08-20 PROCEDURE — 99213 OFFICE O/P EST LOW 20 MIN: CPT | Performed by: NURSE PRACTITIONER

## 2024-08-20 RX ORDER — CEFUROXIME AXETIL 500 MG/1
500 TABLET ORAL 2 TIMES DAILY
Qty: 20 TABLET | Refills: 0 | Status: SHIPPED | OUTPATIENT
Start: 2024-08-20 | End: 2024-08-30

## 2024-08-20 NOTE — PROGRESS NOTES
[facility] URGENT CARE JORGE LUIS NOTE:      Name: Vickie Vuong, 49 y.o.    CSN:[id number]   MRN:[id number]    PCP: Ollie Dietz MD    ALL:    Allergies   Allergen Reactions    Morphine Hallucinations and Other     hallucinations       History:    Chief Complaint: UTI (Strong urine smell, dysuria x 4 days)    Encounter Date: 8/20/2024  16:30    HPI: The history was obtained from the patient. Vickie is a 49 y.o. female, who presents with a chief complaint of UTI (Strong urine smell, dysuria x 4 days). Patient started having UTI symptoms this past Saturday and noticed some burning with urination. Patient has had recurrent UTIs within the past month. Her last visit was 8/3/2024 for a UTI. She was given Augmentin and notes that she did not finish the last 2 days of the antibiotic. Patient feels that her last UTI was not fully resolved. She notes that she has been drinking cranberry juice and increasing her water intake.     She denies increased urinary frequency, urgency, nocturia, hematuria, abnormal vaginal discharge, N/V/D, or flank/back pain. Patient does not have concern for an STI/STD at this time.     PMHx:    Past Medical History:   Diagnosis Date    Personal history of other medical treatment 08/30/2022    History of mammogram              Current Outpatient Medications   Medication Sig Dispense Refill    amitriptyline (Elavil) 50 mg tablet Take 1 tablet (50 mg) by mouth once daily at bedtime. 30 tablet 11    celecoxib (CeleBREX) 200 mg capsule TAKE 1 CAPSULE (200 MG) BY MOUTH 2 TIMES A DAY. 180 capsule 3    dilTIAZem CD (Cardizem CD) 180 mg 24 hr capsule Take 1 capsule (180 mg) by mouth once daily. 90 capsule 3    ergocalciferol (Vitamin D2) 1.25 MG (94292 UT) capsule Take 1 capsule (1,250 mcg) by mouth 1 (one) time per week. 12 capsule 0    montelukast (Singulair) 10 mg tablet TAKE 1 TABLET BY MOUTH EVERY NIGHT 90 tablet 3    pregabalin (Lyrica) 100 mg capsule Take 1 capsule in AM and 2 capsules  at bedtime 90 capsule 1    SUMAtriptan (Imitrex) 100 mg tablet Take 1 tablet (100 mg) by mouth 1 time if needed for migraine. May repeat after 2 hours. 9 tablet 5    topiramate (Topamax) 25 mg tablet Take 1 tablet (25 mg) by mouth once daily at bedtime. 30 tablet 1    cefuroxime (Ceftin) 500 mg tablet Take 1 tablet (500 mg) by mouth 2 times a day for 10 days. 20 tablet 0     No current facility-administered medications for this visit.         PMSx:    Past Surgical History:   Procedure Laterality Date    FL GUIDED INJECTION HIP LEFT Left 11/21/2023    FL GUIDED INJECTION HIP LEFT 11/21/2023 [facility]    HIP SURGERY Left     Lt Flex muscle    OTHER SURGICAL HISTORY  10/24/2019    Back surgery    OTHER SURGICAL HISTORY  10/24/2019    Hysterectomy    OTHER SURGICAL HISTORY  10/24/2019    Appendectomy    OTHER SURGICAL HISTORY  10/24/2019    Cholecystectomy    OTHER SURGICAL HISTORY  10/02/2020    Epidural steroid injection       Fam Hx:   Family History   Problem Relation Name Age of Onset    No Known Problems Mother      No Known Problems Father         SOC. Hx:     Social History     Socioeconomic History    Marital status:      Spouse name: Not on file    Number of children: Not on file    Years of education: Not on file    Highest education level: Not on file   Occupational History    Not on file   Tobacco Use    Smoking status: Never    Smokeless tobacco: Never   Vaping Use    Vaping status: Never Used   Substance and Sexual Activity    Alcohol use: Not Currently    Drug use: Never    Sexual activity: Yes     Birth control/protection: Female Sterilization   Other Topics Concern    Not on file   Social History Narrative    Not on file     Social Determinants of Health     Financial Resource Strain: Not on file   Food Insecurity: Not on file   Transportation Needs: Not on file   Physical Activity: Not on file   Stress: Not on file   Social Connections: Not on file   Intimate Partner Violence: Not on file    Housing Stability: Not on file         Vitals:    08/20/24 1621   BP: 134/85   Pulse: 77   Resp: 16   Temp: 36.3 °C (97.3 °F)   SpO2: 99%     90.2 kg (198 lb 12.8 oz)          Physical Exam  Vitals reviewed.   Constitutional:       Appearance: Normal appearance.   HENT:      Head: Normocephalic and atraumatic.      Mouth/Throat:      Mouth: Mucous membranes are moist.      Pharynx: Oropharynx is clear.   Cardiovascular:      Rate and Rhythm: Normal rate and regular rhythm.   Pulmonary:      Effort: Pulmonary effort is normal.      Breath sounds: Normal breath sounds.   Abdominal:      Tenderness: There is no right CVA tenderness or left CVA tenderness.   Musculoskeletal:         General: Normal range of motion.      Cervical back: Normal range of motion.   Skin:     General: Skin is warm.   Neurological:      General: No focal deficit present.      Mental Status: She is alert and oriented to person, place, and time.   Psychiatric:         Mood and Affect: Mood normal.         LABORATORY @ RADIOLOGICAL IMAGING (if done):     No results found for this or any previous visit (from the past 24 hour(s)).    ____________________________________________________________________    I did personally review Vickie's past medical history, surgical history, social history, as well as family history (when relevant).  In this case, I also oversaw the her drug management by reviewing her medication list, allergy list, as well as the medications that I prescribed during the UC course and/or recommended as an out-patient (including possible OTC medications such as acetaminophen, NSAIDs , etc).    After reviewing the items above, I did look at previous medical documentation, such as recent hospitalizations, office visits, and/or recent consultations with PCP/specialist.                          SDOH:   Another factor that I considered in Vickie's care was her Social Determinants of Health (SDOH). During this UC encounter, she did  not have social determinants of health. Those [facility] influencing Vickie's care are: none       COURSE/MEDICAL DECISION MAKING:    Vickie is a 49 y.o., who presents with a working diagnosis of   1. Acute cystitis without hematuria     with a differential to include: PID, bacterial vaginosis, pyelonephritis, STD    Patient's UA showed cloudy orange urine with trace leuks and positive nitrites consistent with a UTI. She does not have any hematuria, fever, abnormal vaginal discharge, or flank pain.    Plan: Per last urine culture results on 8/3/2024, E.coli cultured is resistant to multiple antibiotics. Will have patient start a ten-day course of Ceftin. Another culture was sent today. Will also send urine for gonorrhea, chlamydia and trich testing. Educated patient to make sure to finish the entire course of antibiotics, even if her symptoms begin to improve. May consider referring patient to urology due to recurrence of UTIs if Ceftin does not resolve this UTI.    I, SHARON Blackmon student, helped prepare the medical record for my supervising clinician, LACHO Chong PA-S, [facility]    Supervised by  Tavares Maza CNP   Advanced Practice Provider  [facility] URGENT CARE    I was present with the PA student who participated in the documentation of this note. I have personally seen and re-examined the patient and performed the medical decision-making components (assessment and plan of care). I have reviewed the PA student documentation and verified the findings in the note as written with additions or exceptions as stated in the body of this note.    Sandra Perez

## 2024-08-21 LAB
C TRACH RRNA SPEC QL NAA+PROBE: NEGATIVE
N GONORRHOEA DNA SPEC QL PROBE+SIG AMP: NEGATIVE
T VAGINALIS RRNA SPEC QL NAA+PROBE: NEGATIVE

## 2024-08-22 ENCOUNTER — TELEPHONE (OUTPATIENT)
Dept: URGENT CARE | Facility: CLINIC | Age: 49
End: 2024-08-22
Payer: COMMERCIAL

## 2024-08-22 LAB — BACTERIA UR CULT: NORMAL

## 2024-08-22 RX ORDER — PHENAZOPYRIDINE HYDROCHLORIDE 100 MG/1
100 TABLET, FILM COATED ORAL 3 TIMES DAILY PRN
Qty: 20 TABLET | Refills: 0 | Status: SHIPPED | OUTPATIENT
Start: 2024-08-22

## 2024-08-28 ENCOUNTER — APPOINTMENT (OUTPATIENT)
Dept: INTEGRATIVE MEDICINE | Facility: CLINIC | Age: 49
End: 2024-08-28
Payer: COMMERCIAL

## 2024-09-03 ENCOUNTER — APPOINTMENT (OUTPATIENT)
Dept: ENDOCRINOLOGY | Facility: CLINIC | Age: 49
End: 2024-09-03
Payer: COMMERCIAL

## 2024-09-10 DIAGNOSIS — M54.17 LUMBOSACRAL RADICULOPATHY: ICD-10-CM

## 2024-09-10 PROCEDURE — RXMED WILLOW AMBULATORY MEDICATION CHARGE

## 2024-09-10 RX ORDER — CELECOXIB 200 MG/1
CAPSULE ORAL
Qty: 180 CAPSULE | Refills: 3 | Status: SHIPPED | OUTPATIENT
Start: 2024-09-10 | End: 2025-09-10

## 2024-09-10 RX ORDER — PREGABALIN 100 MG/1
CAPSULE ORAL
Qty: 90 CAPSULE | Refills: 0 | Status: SHIPPED | OUTPATIENT
Start: 2024-09-10

## 2024-09-16 ENCOUNTER — PHARMACY VISIT (OUTPATIENT)
Dept: PHARMACY | Facility: CLINIC | Age: 49
End: 2024-09-16
Payer: COMMERCIAL

## 2024-09-17 ENCOUNTER — APPOINTMENT (OUTPATIENT)
Dept: INTEGRATIVE MEDICINE | Facility: CLINIC | Age: 49
End: 2024-09-17
Payer: COMMERCIAL

## 2024-09-17 DIAGNOSIS — G43.009 MIGRAINE WITHOUT AURA AND WITHOUT STATUS MIGRAINOSUS, NOT INTRACTABLE: ICD-10-CM

## 2024-09-17 DIAGNOSIS — M54.59 OTHER LOW BACK PAIN: Primary | ICD-10-CM

## 2024-09-17 PROCEDURE — 97810 ACUP 1/> WO ESTIM 1ST 15 MIN: CPT

## 2024-09-17 PROCEDURE — 97811 ACUP 1/> W/O ESTIM EA ADD 15: CPT

## 2024-09-17 PROCEDURE — RXMED WILLOW AMBULATORY MEDICATION CHARGE

## 2024-09-17 RX ORDER — TOPIRAMATE 25 MG/1
25 TABLET ORAL NIGHTLY
Qty: 30 TABLET | Refills: 1 | Status: SHIPPED | OUTPATIENT
Start: 2024-09-17 | End: 2024-11-16

## 2024-09-17 NOTE — PROGRESS NOTES
Subjective   Patient ID: Vickie Vuong is a 49 y.o. female who presents for Back Pain    Update: 09/17/24  Tx 3/20    No pain in particular today but experiences dull achy sensation from L4-S1.    Insurance visit 2 of 20    Patient continues to report right sided low back pain, with some referral into her glutes      Initial intake (07/09/2024 LS)    States she has a lot of low back and nerve issues.  States 2 back surgeries, 1st one failed. Notes suffering a lot of nerve damage in btw surgeries.  Notes ant/post L5-S1 fusion with an allograph which she states completely dissolved, 2nd surgery (2017) (14 mos btw surgeries) was L4-5 S1 fusion  states she needs another fusion of L3 but she is hesitant,   Notes she has had PT, steroid injections (most recent Aug 2023), various meds.   Notes she is on lyrica BID to see if it helps, gabapentin prior.    States sx include RIGHT big toes always numb and tingling.  Worse with more physical activity.  Notes her limit to exercise is rowing 3x/wk, Saturday she does spin class     LBP   Notes pain is across low back worse RIGHT side.  Occ random radiation to achilles tendon if she weeds or bends over   Occ shooting pain down outer R thigh.  Sleep is good, pn not waking her nightly.     States no GB and partial hysterectomy 2016        Session Information  Is this acupuncture treatment being billed to the patient's insurance company: Yes  This is visit number: 3  The patient has a total number of visits of: 20  Initial Acupuncture Treatment date: 07/09/24  Last Treatment date: 08/14/24  Name of Insurance Company:  Employee Medical Plan  Visit Type: Follow-up visit  Medical History Reviewed: I have reviewed pertinent medical history in EHR, and no contraindications are present to provide treatment         Review of Systems         Provider reviewed plan for the acupuncture session, precautions and contraindications. Patient/guardian/hospital staff has given consent to treat  with full understanding of what to expect during the session. Before acupuncture began, provider explained to the patient to communicate at any time if the procedure was causing discomfort past their tolerance level. Patient agreed to advise acupuncturist. The acupuncturist counseled the patient on the risks of acupuncture treatment including pain, infection, bleeding, and no relief of pain. The patient was positioned comfortably. There was no evidence of infection at the site of needle insertions.    Objective   Physical Exam         Treatment Plan  Treatment Goals: Pain management    Acupuncture Treatment  Patient Position: Prone on a table  Acupuncture Needling: Yes  Needle Guage: 36 guage /.20/ Blue seirin  Body Points: With retention  Body Points - Bilateral: BL23,25,26,27,28,29; KD3; HTJJ L4-S1  Auricular Points: No  Electroacupuncture Used: No  Other Techniques Utilized: TDP Lamp, Topicals  Topicals Description: Po Sum On  TDP Lamp Descripton: 20mins on low back  Needle Count In: 20  Needle Count Out: 20  Needle Retention Time (min): 30 minutes  Total Face to Face Time (min): 30 minutes              Assessment/Plan

## 2024-09-30 ENCOUNTER — LAB (OUTPATIENT)
Dept: LAB | Facility: LAB | Age: 49
End: 2024-09-30
Payer: COMMERCIAL

## 2024-09-30 DIAGNOSIS — R53.82 CHRONIC FATIGUE, UNSPECIFIED: ICD-10-CM

## 2024-09-30 DIAGNOSIS — E55.9 VITAMIN D DEFICIENCY, UNSPECIFIED: ICD-10-CM

## 2024-09-30 DIAGNOSIS — Z13.220 ENCOUNTER FOR SCREENING FOR LIPOID DISORDERS: ICD-10-CM

## 2024-09-30 DIAGNOSIS — E34.9 ENDOCRINE DISORDER, UNSPECIFIED: Primary | ICD-10-CM

## 2024-09-30 LAB
25(OH)D3 SERPL-MCNC: 35 NG/ML (ref 30–100)
ALBUMIN SERPL BCP-MCNC: 4.1 G/DL (ref 3.4–5)
ALP SERPL-CCNC: 63 U/L (ref 33–110)
ALT SERPL W P-5'-P-CCNC: 10 U/L (ref 7–45)
ANION GAP SERPL CALC-SCNC: 9 MMOL/L (ref 10–20)
AST SERPL W P-5'-P-CCNC: 13 U/L (ref 9–39)
BASOPHILS # BLD AUTO: 0.07 X10*3/UL (ref 0–0.1)
BASOPHILS NFR BLD AUTO: 1.1 %
BILIRUB SERPL-MCNC: 0.4 MG/DL (ref 0–1.2)
BUN SERPL-MCNC: 18 MG/DL (ref 6–23)
CALCIUM SERPL-MCNC: 9.2 MG/DL (ref 8.6–10.3)
CHLORIDE SERPL-SCNC: 107 MMOL/L (ref 98–107)
CO2 SERPL-SCNC: 28 MMOL/L (ref 21–32)
CREAT SERPL-MCNC: 0.87 MG/DL (ref 0.5–1.05)
CRP SERPL HS-MCNC: 2.4 MG/L
DHEA-S SERPL-MCNC: 55 UG/DL (ref 12–379)
EGFRCR SERPLBLD CKD-EPI 2021: 82 ML/MIN/1.73M*2
EOSINOPHIL # BLD AUTO: 0.21 X10*3/UL (ref 0–0.7)
EOSINOPHIL NFR BLD AUTO: 3.2 %
ERYTHROCYTE [DISTWIDTH] IN BLOOD BY AUTOMATED COUNT: 12.5 % (ref 11.5–14.5)
EST. AVERAGE GLUCOSE BLD GHB EST-MCNC: 97 MG/DL
ESTRADIOL SERPL-MCNC: 48 PG/ML
FERRITIN SERPL-MCNC: 21 NG/ML (ref 8–150)
FSH SERPL-ACNC: 30.1 IU/L
GGT SERPL-CCNC: 15 U/L (ref 5–55)
GLUCOSE SERPL-MCNC: 89 MG/DL (ref 74–99)
HBA1C MFR BLD: 5 %
HCT VFR BLD AUTO: 40.8 % (ref 36–46)
HGB BLD-MCNC: 13.1 G/DL (ref 12–16)
IMM GRANULOCYTES # BLD AUTO: 0.01 X10*3/UL (ref 0–0.7)
IMM GRANULOCYTES NFR BLD AUTO: 0.2 % (ref 0–0.9)
INSULIN P FAST SERPL-ACNC: 24 UIU/ML (ref 3–25)
LYMPHOCYTES # BLD AUTO: 2.57 X10*3/UL (ref 1.2–4.8)
LYMPHOCYTES NFR BLD AUTO: 39.2 %
MCH RBC QN AUTO: 30 PG (ref 26–34)
MCHC RBC AUTO-ENTMCNC: 32.1 G/DL (ref 32–36)
MCV RBC AUTO: 93 FL (ref 80–100)
MONOCYTES # BLD AUTO: 0.41 X10*3/UL (ref 0.1–1)
MONOCYTES NFR BLD AUTO: 6.3 %
NEUTROPHILS # BLD AUTO: 3.28 X10*3/UL (ref 1.2–7.7)
NEUTROPHILS NFR BLD AUTO: 50 %
NRBC BLD-RTO: 0 /100 WBCS (ref 0–0)
PLATELET # BLD AUTO: 303 X10*3/UL (ref 150–450)
POTASSIUM SERPL-SCNC: 3.9 MMOL/L (ref 3.5–5.3)
PROT SERPL-MCNC: 6.1 G/DL (ref 6.4–8.2)
RBC # BLD AUTO: 4.37 X10*6/UL (ref 4–5.2)
SODIUM SERPL-SCNC: 140 MMOL/L (ref 136–145)
T3FREE SERPL-MCNC: 3.2 PG/ML (ref 2.3–4.2)
T4 FREE SERPL-MCNC: 0.62 NG/DL (ref 0.61–1.12)
THYROPEROXIDASE AB SERPL-ACNC: 29 IU/ML
TSH SERPL-ACNC: 1.29 MIU/L (ref 0.44–3.98)
VIT B12 SERPL-MCNC: 229 PG/ML (ref 211–911)
WBC # BLD AUTO: 6.6 X10*3/UL (ref 4.4–11.3)

## 2024-09-30 PROCEDURE — 82607 VITAMIN B-12: CPT

## 2024-09-30 PROCEDURE — 84481 FREE ASSAY (FT-3): CPT

## 2024-09-30 PROCEDURE — 83036 HEMOGLOBIN GLYCOSYLATED A1C: CPT

## 2024-09-30 PROCEDURE — 84402 ASSAY OF FREE TESTOSTERONE: CPT

## 2024-09-30 PROCEDURE — 82306 VITAMIN D 25 HYDROXY: CPT

## 2024-09-30 PROCEDURE — 83525 ASSAY OF INSULIN: CPT

## 2024-09-30 PROCEDURE — 82977 ASSAY OF GGT: CPT

## 2024-09-30 PROCEDURE — 36415 COLL VENOUS BLD VENIPUNCTURE: CPT

## 2024-09-30 PROCEDURE — 82670 ASSAY OF TOTAL ESTRADIOL: CPT

## 2024-09-30 PROCEDURE — 86141 C-REACTIVE PROTEIN HS: CPT

## 2024-09-30 PROCEDURE — 80053 COMPREHEN METABOLIC PANEL: CPT

## 2024-09-30 PROCEDURE — 82627 DEHYDROEPIANDROSTERONE: CPT

## 2024-09-30 PROCEDURE — 82728 ASSAY OF FERRITIN: CPT

## 2024-09-30 PROCEDURE — 86376 MICROSOMAL ANTIBODY EACH: CPT

## 2024-09-30 PROCEDURE — 83001 ASSAY OF GONADOTROPIN (FSH): CPT

## 2024-09-30 PROCEDURE — 84443 ASSAY THYROID STIM HORMONE: CPT

## 2024-09-30 PROCEDURE — 84439 ASSAY OF FREE THYROXINE: CPT

## 2024-09-30 PROCEDURE — 85025 COMPLETE CBC W/AUTO DIFF WBC: CPT

## 2024-10-04 LAB
TESTOSTERONE FREE (CHAN): 2.9 PG/ML (ref 0.1–6.4)
TESTOSTERONE,TOTAL,LC-MS/MS: 24 NG/DL (ref 2–45)

## 2024-10-05 ENCOUNTER — PHARMACY VISIT (OUTPATIENT)
Dept: PHARMACY | Facility: CLINIC | Age: 49
End: 2024-10-05
Payer: COMMERCIAL

## 2024-10-10 ENCOUNTER — HOSPITAL ENCOUNTER (OUTPATIENT)
Dept: RADIOLOGY | Facility: EXTERNAL LOCATION | Age: 49
Discharge: HOME | End: 2024-10-10

## 2024-10-10 ENCOUNTER — OFFICE VISIT (OUTPATIENT)
Dept: OBSTETRICS AND GYNECOLOGY | Facility: CLINIC | Age: 49
End: 2024-10-10
Payer: COMMERCIAL

## 2024-10-10 ENCOUNTER — CLINICAL SUPPORT (OUTPATIENT)
Dept: ORTHOPEDIC SURGERY | Facility: CLINIC | Age: 49
End: 2024-10-10
Payer: COMMERCIAL

## 2024-10-10 ENCOUNTER — HOSPITAL ENCOUNTER (OUTPATIENT)
Dept: RADIOLOGY | Facility: CLINIC | Age: 49
Discharge: HOME | End: 2024-10-10
Payer: COMMERCIAL

## 2024-10-10 VITALS — BODY MASS INDEX: 36.15 KG/M2 | WEIGHT: 210.6 LBS

## 2024-10-10 VITALS
BODY MASS INDEX: 36.18 KG/M2 | TEMPERATURE: 96.4 F | RESPIRATION RATE: 16 BRPM | SYSTOLIC BLOOD PRESSURE: 132 MMHG | HEART RATE: 87 BPM | OXYGEN SATURATION: 100 % | WEIGHT: 210.8 LBS | DIASTOLIC BLOOD PRESSURE: 84 MMHG

## 2024-10-10 DIAGNOSIS — R30.0 DYSURIA: ICD-10-CM

## 2024-10-10 DIAGNOSIS — M25.522 LEFT ELBOW PAIN: ICD-10-CM

## 2024-10-10 DIAGNOSIS — M77.12 EPICONDYLITIS, LATERAL, LEFT: Primary | ICD-10-CM

## 2024-10-10 DIAGNOSIS — N39.3 SUI (STRESS URINARY INCONTINENCE, FEMALE): ICD-10-CM

## 2024-10-10 DIAGNOSIS — N39.0 RECURRENT UTI: Primary | ICD-10-CM

## 2024-10-10 LAB
APPEARANCE UR: CLEAR
BILIRUB UR STRIP.AUTO-MCNC: NEGATIVE MG/DL
COLOR UR: NORMAL
GLUCOSE UR STRIP.AUTO-MCNC: NORMAL MG/DL
HOLD SPECIMEN: NORMAL
KETONES UR STRIP.AUTO-MCNC: NEGATIVE MG/DL
LEUKOCYTE ESTERASE UR QL STRIP.AUTO: NEGATIVE
NITRITE UR QL STRIP.AUTO: NEGATIVE
PH UR STRIP.AUTO: 6 [PH]
PROT UR STRIP.AUTO-MCNC: NEGATIVE MG/DL
RBC # UR STRIP.AUTO: NEGATIVE /UL
SP GR UR STRIP.AUTO: 1.01
UROBILINOGEN UR STRIP.AUTO-MCNC: NORMAL MG/DL

## 2024-10-10 PROCEDURE — 73080 X-RAY EXAM OF ELBOW: CPT | Mod: LT

## 2024-10-10 PROCEDURE — 99214 OFFICE O/P EST MOD 30 MIN: CPT | Performed by: STUDENT IN AN ORGANIZED HEALTH CARE EDUCATION/TRAINING PROGRAM

## 2024-10-10 PROCEDURE — 99214 OFFICE O/P EST MOD 30 MIN: CPT | Performed by: SPECIALIST

## 2024-10-10 PROCEDURE — 81003 URINALYSIS AUTO W/O SCOPE: CPT | Performed by: STUDENT IN AN ORGANIZED HEALTH CARE EDUCATION/TRAINING PROGRAM

## 2024-10-10 PROCEDURE — 76882 US LMTD JT/FCL EVL NVASC XTR: CPT | Performed by: SPECIALIST

## 2024-10-10 RX ORDER — DICLOFENAC SODIUM 10 MG/G
4 GEL TOPICAL 4 TIMES DAILY PRN
Qty: 100 G | Refills: 1 | Status: SHIPPED | OUTPATIENT
Start: 2024-10-10

## 2024-10-10 RX ORDER — IBUPROFEN 600 MG/1
600 TABLET ORAL EVERY 8 HOURS PRN
Qty: 28 TABLET | Refills: 1 | Status: SHIPPED | OUTPATIENT
Start: 2024-10-10

## 2024-10-10 ASSESSMENT — PAIN SCALES - GENERAL: PAINLEVEL: 0-NO PAIN

## 2024-10-10 ASSESSMENT — PAIN DESCRIPTION - DESCRIPTORS: DESCRIPTORS: BURNING;PINS AND NEEDLES

## 2024-10-10 ASSESSMENT — PAIN - FUNCTIONAL ASSESSMENT: PAIN_FUNCTIONAL_ASSESSMENT: 0-10

## 2024-10-10 NOTE — ASSESSMENT & PLAN NOTE
Assessment: Left elbow common extensor tendon injury so-called tennis elbow    Plan:  Wrist splint  Voltaren gel  Motrin 600 mg p.o. 3 times daily as needed pain take as directed with meals  Follow-up in 2 to 3 weeks for reevaluation.  I reviewed the ultrasound in real-time with the patient.

## 2024-10-10 NOTE — PROGRESS NOTES
Referred by: Stacy Aguilar CNM     PCP  Ollie Dietz MD         CHIEF COMPLAINT:  Ed         HISTORY OF PRESENT ILLNESS:  This is a  49 y.o. y.o. female who presents with Ed. Several over the summer, last in August.     UTI symptoms: burning, urgency, frequency, bladder spasm    The following were reviewed to gain additional history:  External notes: Stacy Aguilar CNM, s/p hysterectomy no cervix identified  [facility] NP Urgent care re: UTI symptoms x 1 month, rx'd augmentin  Test results: A1c 5.0% 9/30/24  Urine culture 8/20/24 no significant growth  Urine culture 8/3/24, E coli, resistant to ampicillin, cipro, nitrofurantoin, bactrim  Urine culture 7/13/24, 20-80 E coli, resistant to cipro and bactrim         Specifically, she describes the following pelvic floor symptoms:          Prolapse: No       - Splinting to urinate: No       - Splinting for bowel movement/stool trapping: No              Incontinence:  Yes             Stress, starting to get bothersome              Urinary Symptoms:       - Frequency:  No             # Voids:         - Nocturia: No             # Voids:         - Urgency:  No       - Incomplete emptying:  No       - Hesitancy:  No       - Pain with voiding:  No       - Excessive fluid intake: Yes - 80 oz +               History:       - Recurrent UTI:  Yes -        - Hematuria:  No       - Stones:  No       - Kidney Disease:  No                  Bowel Symptoms:       - Regular: No       - Diarrhea:No       - Constipation: Yes, occasional magnesium       - Fecal Incontinence:  No       - Flatus Incontinence:  No       - Fecal urgency:   No    Past medical and surgical hx reviewed - pertinent for   PMH back pain, s/p two back fusions  PSH  LAVH, 2 x csection, appy, luis  Smoking history: denies        Gyn History:  - Menopausal: No  - HRT: No  - Pap up to date: Yes - s/p hyst  - Sexually active:  Yes  Dyspareunia: No   Other issues:   - Number of prior vaginal deliveries: 0   Number of  prior operative deliveries: 0   Prior OASI? 0  - Number of prior c-sections: 2    - Mammogram up to date: Yes  - Colonoscopy up to date: No    OB History          3    Para   3    Term   2       1    AB   0    Living   2         SAB   0    IAB   0    Ectopic   0    Multiple   0    Live Births   2                       PHYSICAL EXAMINATION:  No LMP recorded. Patient has had a hysterectomy.  There is no height or weight on file to calculate BMI.  There were no vitals taken for this visit.  General Appearance: well appearing  Neuro: Alert and oriented   HEENT: mucous membranes moist, neck supple  Resp: No respiratory distress, normal work of breathing  MSK: normal range of motion, gait appropriate    Pelvic:  Genitourinary: normal external genitalia, Bartholin's glands negative, Roxie's glands negative  Urethra: normal meatus, non-tender, no periurethral mass  Vaginal mucosa  normal  Cervix surgically absent  Uterus surgically absent  Adnexae  negative nontender, no masses  Atrophy negative    CST negative    POP-Q (in supine position):  No significant prolapse    Rectal: no hemorrhoids, fissures or masses    PVR (by Ultrasound): 95 ml       IMPRESSION AND PLAN:  Vickie Vuong is a 49 y.o. who presents with Ed, YENY.    Recurrent UTI  - reviewed anatomical and physiological risk factors for developing UTI (female anatomy, post-menopausal status)  - counseled on definition of UTI per IDSA guidelines: positive culture in setting of typical symptoms  - reviewed that goal is prevention, to avoid antibiotics if possible  - discussed prevention strategies: vaginal estrogen (counseled that this is the most effective option), methenamine course for 6-12 months, D-mannose, cranberry (36 mg daily of PACs)  - no e/o atrophy, will defer estrogen at this time  - only two in past two months, if one more in 6 month period will start methenamine  - other wise continue to monitor  - reviewed importance of obtaining  culture data with each episode of symptoms: counseled on risk of bacterial antibiotic resistance, and inappropriate treatment if diagnosis is uncertain    YENY  - discussed etiology of YENY and potential risk factors  - reviewed management strategies including PFPT, anti-incontinence ring, urethral bulking injections and midurethral sling placement  - opting for PFPT, referral made today      All questions and concerns were answered and addressed.  The patient expressed understanding and agrees with the plan.     RTC 6 months    10/10/2024

## 2024-10-21 PROCEDURE — RXMED WILLOW AMBULATORY MEDICATION CHARGE

## 2024-10-22 ENCOUNTER — TELEPHONE (OUTPATIENT)
Dept: PAIN MEDICINE | Facility: CLINIC | Age: 49
End: 2024-10-22
Payer: COMMERCIAL

## 2024-10-22 DIAGNOSIS — M54.17 LUMBOSACRAL RADICULOPATHY: ICD-10-CM

## 2024-10-22 PROCEDURE — RXMED WILLOW AMBULATORY MEDICATION CHARGE

## 2024-10-22 RX ORDER — PREGABALIN 100 MG/1
CAPSULE ORAL
Qty: 90 CAPSULE | Refills: 0 | OUTPATIENT
Start: 2024-10-22

## 2024-10-22 RX ORDER — PREGABALIN 100 MG/1
CAPSULE ORAL
Qty: 90 CAPSULE | Refills: 3 | Status: SHIPPED | OUTPATIENT
Start: 2024-10-22

## 2024-10-22 NOTE — TELEPHONE ENCOUNTER
----- Message from Renetta Patel sent at 2/10/2020 10:21 AM CST -----  CORS, poss PCI with Dr Swann/Catracho - 2/19, 730am admit  H&P - 2/18 at [facility]D      Thanks!     Patient should call office when refill is needed.

## 2024-10-24 ENCOUNTER — PHARMACY VISIT (OUTPATIENT)
Dept: PHARMACY | Facility: CLINIC | Age: 49
End: 2024-10-24
Payer: COMMERCIAL

## 2024-11-07 ENCOUNTER — APPOINTMENT (OUTPATIENT)
Dept: ORTHOPEDIC SURGERY | Facility: CLINIC | Age: 49
End: 2024-11-07
Payer: COMMERCIAL

## 2024-11-07 PROCEDURE — RXMED WILLOW AMBULATORY MEDICATION CHARGE

## 2024-11-09 ENCOUNTER — PHARMACY VISIT (OUTPATIENT)
Dept: PHARMACY | Facility: CLINIC | Age: 49
End: 2024-11-09
Payer: COMMERCIAL

## 2024-11-14 ENCOUNTER — APPOINTMENT (OUTPATIENT)
Dept: OBSTETRICS AND GYNECOLOGY | Facility: CLINIC | Age: 49
End: 2024-11-14
Payer: COMMERCIAL

## 2024-11-19 ENCOUNTER — APPOINTMENT (OUTPATIENT)
Dept: ORTHOPEDIC SURGERY | Facility: CLINIC | Age: 49
End: 2024-11-19
Payer: COMMERCIAL

## 2024-11-23 PROCEDURE — RXMED WILLOW AMBULATORY MEDICATION CHARGE

## 2024-11-24 ENCOUNTER — PHARMACY VISIT (OUTPATIENT)
Dept: PHARMACY | Facility: CLINIC | Age: 49
End: 2024-11-24
Payer: COMMERCIAL

## 2024-11-24 PROCEDURE — RXOTC WILLOW AMBULATORY OTC CHARGE

## 2024-12-09 DIAGNOSIS — G43.009 MIGRAINE WITHOUT AURA AND WITHOUT STATUS MIGRAINOSUS, NOT INTRACTABLE: ICD-10-CM

## 2024-12-09 PROCEDURE — RXMED WILLOW AMBULATORY MEDICATION CHARGE

## 2024-12-09 RX ORDER — TOPIRAMATE 25 MG/1
25 TABLET ORAL NIGHTLY
Qty: 30 TABLET | Refills: 1 | Status: CANCELLED | OUTPATIENT
Start: 2024-12-09 | End: 2025-02-07

## 2024-12-10 ENCOUNTER — APPOINTMENT (OUTPATIENT)
Dept: ORTHOPEDIC SURGERY | Facility: CLINIC | Age: 49
End: 2024-12-10
Payer: COMMERCIAL

## 2024-12-18 ENCOUNTER — HOSPITAL ENCOUNTER (OUTPATIENT)
Dept: RADIOLOGY | Facility: HOSPITAL | Age: 49
Discharge: HOME | End: 2024-12-18
Payer: COMMERCIAL

## 2024-12-18 ENCOUNTER — OFFICE VISIT (OUTPATIENT)
Dept: PAIN MEDICINE | Facility: CLINIC | Age: 49
End: 2024-12-18
Payer: COMMERCIAL

## 2024-12-18 ENCOUNTER — PHARMACY VISIT (OUTPATIENT)
Dept: PHARMACY | Facility: CLINIC | Age: 49
End: 2024-12-18
Payer: COMMERCIAL

## 2024-12-18 VITALS
DIASTOLIC BLOOD PRESSURE: 86 MMHG | HEART RATE: 109 BPM | RESPIRATION RATE: 16 BRPM | WEIGHT: 217 LBS | BODY MASS INDEX: 37.25 KG/M2 | SYSTOLIC BLOOD PRESSURE: 149 MMHG

## 2024-12-18 DIAGNOSIS — M43.10 ACQUIRED SPONDYLOLISTHESIS: ICD-10-CM

## 2024-12-18 DIAGNOSIS — M54.17 LUMBOSACRAL RADICULOPATHY: Primary | ICD-10-CM

## 2024-12-18 DIAGNOSIS — M54.17 LUMBOSACRAL RADICULOPATHY: ICD-10-CM

## 2024-12-18 DIAGNOSIS — Z98.1 S/P SPINAL FUSION: ICD-10-CM

## 2024-12-18 PROCEDURE — 72100 X-RAY EXAM L-S SPINE 2/3 VWS: CPT | Performed by: RADIOLOGY

## 2024-12-18 PROCEDURE — RXMED WILLOW AMBULATORY MEDICATION CHARGE

## 2024-12-18 PROCEDURE — 99214 OFFICE O/P EST MOD 30 MIN: CPT | Performed by: PHYSICIAN ASSISTANT

## 2024-12-18 PROCEDURE — 72100 X-RAY EXAM L-S SPINE 2/3 VWS: CPT

## 2024-12-18 RX ORDER — PREGABALIN 225 MG/1
225 CAPSULE ORAL 2 TIMES DAILY
Qty: 60 CAPSULE | Refills: 2 | Status: SHIPPED | OUTPATIENT
Start: 2024-12-18

## 2024-12-18 ASSESSMENT — ENCOUNTER SYMPTOMS
CARDIOVASCULAR NEGATIVE: 1
BACK PAIN: 1
GASTROINTESTINAL NEGATIVE: 1
WEAKNESS: 1
HEMATOLOGIC/LYMPHATIC NEGATIVE: 1
NUMBNESS: 1
MYALGIAS: 1
PSYCHIATRIC NEGATIVE: 1
ARTHRALGIAS: 1
EYES NEGATIVE: 1
CONSTITUTIONAL NEGATIVE: 1
ENDOCRINE NEGATIVE: 1
RESPIRATORY NEGATIVE: 1
ALLERGIC/IMMUNOLOGIC NEGATIVE: 1

## 2024-12-18 ASSESSMENT — PATIENT HEALTH QUESTIONNAIRE - PHQ9
2. FEELING DOWN, DEPRESSED OR HOPELESS: NOT AT ALL
1. LITTLE INTEREST OR PLEASURE IN DOING THINGS: NOT AT ALL
SUM OF ALL RESPONSES TO PHQ9 QUESTIONS 1 AND 2: 0

## 2024-12-18 NOTE — PROGRESS NOTES
Subjective   Patient ID: Vickie Vuong is a 49 y.o. female who presents for Med Management (DISCUSS LYRICA DOSE , SHE HAS ONGOING NERVE PAIn MORE THAN BACK PAIN, PAIN INTO HER RIGHT LEG DOWN TO HER TOES, PAIN CAN BE COLD SENSATION, TINGLING, BURNING FEELING FOR THE PAST COUPLE OF MONTHS, SHE GETS IT MORE WITH SITTING AND STANDING, ADL, ).SHE CONTINUES HOME STRETCHING AND REPOSITIONING FOR RELIEF, PAIN SCORE 2/10, ANA=6    Marley Shaikh, ZACKERY 12/18/24 8:10 AM     Patient is a 49-year-old female.  She presents today for a follow-up after continuing Lyrica.  100 mg in the morning and 200 mg at night.  She has been using this since her last appointment but she is noticing some leg numbness and tingling.  She feels like her leg feels cold and that there is water running down it.  She has noticed an increase of her nerve pain and she is wondering if there is something else going on.  She has a history of L5-S1 ALIF on 7/15/15 done by Dr. Gianni Cordova and then a posterior lumbar fusion L4-S1 in 2017 by Dr. Humza Boyd.  She historically did not want to have any injections.  She wonders what else she can do to try to get relief but she states that she would be a little bit more open to discussing her options.  She has the leg pain, numbness, tingling and the cold type sensation that is affecting her quality of life.  She rates it a 2-6/10 depending on the time of day that it is.  She is a history of therapy after her surgeries.  She continues to do the home exercises that were taught to her.  She does try to do a spin class a few days a week.  She is able to do this better than walk on her treadmill.  The more she is upright and active and the more she walks the worst the pain gets.        Review of Systems   Constitutional: Negative.    HENT: Negative.     Eyes: Negative.    Respiratory: Negative.     Cardiovascular: Negative.    Gastrointestinal: Negative.    Endocrine: Negative.    Genitourinary: Negative.    Musculoskeletal:   Positive for arthralgias, back pain, gait problem and myalgias.   Skin: Negative.    Allergic/Immunologic: Negative.    Neurological:  Positive for weakness and numbness.   Hematological: Negative.    Psychiatric/Behavioral: Negative.         Objective   Physical Exam  Vitals and nursing note reviewed.   Constitutional:       General: She is not in acute distress.     Appearance: Normal appearance. She is not ill-appearing.   HENT:      Head: Normocephalic and atraumatic.      Right Ear: External ear normal.      Left Ear: External ear normal.      Nose: Nose normal.      Mouth/Throat:      Pharynx: Oropharynx is clear.   Eyes:      Conjunctiva/sclera: Conjunctivae normal.   Cardiovascular:      Rate and Rhythm: Normal rate and regular rhythm.      Pulses: Normal pulses.   Pulmonary:      Effort: Pulmonary effort is normal.      Breath sounds: Normal breath sounds.   Musculoskeletal:         General: Normal range of motion.      Cervical back: Normal range of motion.      Comments: 5/5 strength other than bilateral hip flexion 4+ to 5 -/5   Skin:     General: Skin is warm and dry.   Neurological:      General: No focal deficit present.      Mental Status: She is alert and oriented to person, place, and time. Mental status is at baseline.   Psychiatric:         Mood and Affect: Mood normal.         Behavior: Behavior normal.         Thought Content: Thought content normal.         Judgment: Judgment normal.       MR lumbar spine wo IV contrast  Status: Final result     PACS Images     Show images for MR lumbar spine wo IV contrast  Signed by    Signed Time Phone Pager   Cher Preston MD 9/18/2023 10:44 [phone]     Exam Information    Status Exam Begun Exam Ended   Final  9/18/2023 07:45     Study Result    Narrative   Interpreted By:  CHER PRESTON MD  MRN: [id number]  Patient Name: LEORA QUILES     STUDY:  MRI L-SPINE WO; ;  9/18/2023 7:45 am     INDICATION:  lower back and leg pain  M54.50: Chronic low  back pain without  sciatica, unspecified back pain laterality M54.17: Lumbosacral  radiculopathy M47.817: Lumbosacral spondylosis.     COMPARISON:  MRI of the lumbar spine from 07/11/2022.     ACCESSION NUMBER(S):  [id number]     ORDERING CLINICIAN:  SUBHA ESTES     TECHNIQUE:  MRI of the lumbar spine was performed with acquisition of sagittal  T2, sagittal T1, sagittal STIR, axial T1, and axial T2 weighted  sequences.     FINDINGS:  This report assumes 5 non-rib bearing lumbar vertebral bodies. The  lowest intervertebral disc will be labeled L5-S1.     There is stable slight retrolisthesis at L2-L3. Grade 1  anterolisthesis at L3-L4 appears slightly more pronounced. There is  stable grade 1 anterolisthesis at L5-S1. There is stable mild  posterior wedging of the L5 vertebral body. Remaining vertebral body  heights are maintained.     There are postoperative changes from laminectomies at L5 with spinal  fusion including placement of bilateral pedicular screws which  terminate within the L4, L5, and S1 vertebral bodies and are  interconnected by parallel rods. There is also an intervertebral disc  spacer at L5-S1. There is disc desiccation at multiple levels. There  are stable chronic degenerative endplate changes at multiple levels.     The conus medullaris terminates at the level of L1-L2 and is  unremarkable in appearance.     At T12-L1, there is no posterior disc contour abnormality. There is  no spinal canal stenosis or neural foraminal narrowing. There is no  facet osteoarthropathy.     At L1-L2, there is striking posterior disc contour abnormality. There  is no spinal canal stenosis or neural foraminal narrowing. There is  no facet osteoarthropathy.     At L2-L3, there is a stable small diffuse disc bulge. There is no  spinal canal stenosis. There is stable extension of disc into the  neural foramina and there is at most mild left neural foraminal  narrowing, unchanged. There is stable moderate left and mild  right  facet osteoarthropathy.     At L3-L4, there is mild spinal canal stenosis due to combination of  diffuse disc bulge, grade 1 anterolisthesis, ligamentum flavum  thickening, prominent posterior epidural fat, and marked facet  osteoarthropathy. Spinal canal stenosis has slightly increased since  prior. There is extension of disc into the bilateral neural foramina  and along with facet osteoarthropathy causes mild bilateral neural  foraminal narrowing, unchanged.     At L4-L5, there is no posterior disc contour abnormality. There is no  spinal canal stenosis or neural foraminal narrowing. Not adequately  evaluate the facet joints due to susceptibility artifact from  surgical hardware, noting this, there is fusion of the facet joints  related to prior spinal surgery.     At L5-S1, is no posterior disc contour abnormality. There is no  spinal canal stenosis. There is stable moderate to marked right and  moderate left neural foraminal narrowing primarily due to facet  osteoarthropathy. There is stable signal abnormality located within  the right lateral epidural space, probably sequela of prior surgery.      Impression   Compared to the MRI lumbar spine study from 07/11/2022, mild spinal  canal stenosis at L3-L4 has slightly increased with slight increase  in grade 1 anterolisthesis. Otherwise, similar degenerative changes  of the lumbar spine and postoperative changes.     This study was interpreted at [facility].     MACRO:  None     Assessment/Plan   Diagnoses and all orders for this visit:  Lumbosacral radiculopathy  -     XR lumbar spine 2-3 views; Future  -     MR lumbar spine wo IV contrast; Future  -     pregabalin (Lyrica) 225 mg capsule; Take 1 capsule (225 mg) by mouth 2 times a day.  Acquired spondylolisthesis  -     XR lumbar spine 2-3 views; Future  -     MR lumbar spine wo IV contrast; Future  -     pregabalin (Lyrica) 225 mg capsule; Take 1 capsule (225 mg) by  mouth 2 times a day.  S/P spinal fusion  -     XR lumbar spine 2-3 views; Future  -     MR lumbar spine wo IV contrast; Future  -     pregabalin (Lyrica) 225 mg capsule; Take 1 capsule (225 mg) by mouth 2 times a day.       Patient is a 49-year-old female with a past medical history significant for the above-mentioned medical diagnoses.  She has a history of anterior as well as posterior lumbar fusion.  She had some adjacent level stenosis back on her MRI in 2023.  She states that her symptoms have progressed.  She is noticing more leg pain, numbness, tingling and a cold type sensation that is down her legs.  At this time, based on her previous imaging findings, her pain pattern, her failure to improve with conservative treatments and the significant pain she is still experiencing I recommended an updated lumbar x-ray as well as MRI scan for possible injection options versus surgical consultation depend on the results.  Patient is agreeable.  She will follow-up after the imaging.  We will also increase her Lyrica.  She is going to use the 100 mg pill she has at home to go to 200 mg twice a day and then I am going to send her a new prescription for 225 mg 1 p.o. twice daily to switch to.  We will see if we can get better control of her symptoms with this medication dose.  OARRS reviewed.  Follow-up as above mention.

## 2024-12-27 ENCOUNTER — TELEPHONE (OUTPATIENT)
Dept: PAIN MEDICINE | Facility: CLINIC | Age: 49
End: 2024-12-27
Payer: COMMERCIAL

## 2024-12-27 ENCOUNTER — HOSPITAL ENCOUNTER (OUTPATIENT)
Dept: RADIOLOGY | Facility: HOSPITAL | Age: 49
Discharge: HOME | End: 2024-12-27
Payer: COMMERCIAL

## 2024-12-27 DIAGNOSIS — M54.17 LUMBOSACRAL RADICULOPATHY: ICD-10-CM

## 2024-12-27 DIAGNOSIS — Z98.1 S/P SPINAL FUSION: ICD-10-CM

## 2024-12-27 DIAGNOSIS — M43.10 ACQUIRED SPONDYLOLISTHESIS: ICD-10-CM

## 2024-12-27 PROCEDURE — 72148 MRI LUMBAR SPINE W/O DYE: CPT

## 2024-12-27 NOTE — TELEPHONE ENCOUNTER
----- Message from Marley CARRILLO sent at 12/18/2024  8:18 AM EST -----  Regarding: mri auth  Lumbar fusion, mri since, vascular clips, anytime after 4pm

## 2025-01-02 ENCOUNTER — APPOINTMENT (OUTPATIENT)
Dept: PAIN MEDICINE | Facility: CLINIC | Age: 50
End: 2025-01-02
Payer: COMMERCIAL

## 2025-01-07 ENCOUNTER — OFFICE VISIT (OUTPATIENT)
Dept: PAIN MEDICINE | Facility: CLINIC | Age: 50
End: 2025-01-07
Payer: COMMERCIAL

## 2025-01-07 VITALS
WEIGHT: 220 LBS | HEART RATE: 101 BPM | BODY MASS INDEX: 37.76 KG/M2 | SYSTOLIC BLOOD PRESSURE: 127 MMHG | DIASTOLIC BLOOD PRESSURE: 84 MMHG | RESPIRATION RATE: 16 BRPM

## 2025-01-07 DIAGNOSIS — M54.17 LUMBOSACRAL RADICULOPATHY: Primary | ICD-10-CM

## 2025-01-07 DIAGNOSIS — Z98.1 S/P SPINAL FUSION: ICD-10-CM

## 2025-01-07 PROCEDURE — 99213 OFFICE O/P EST LOW 20 MIN: CPT

## 2025-01-07 ASSESSMENT — ENCOUNTER SYMPTOMS
WEAKNESS: 0
PALPITATIONS: 0
EYES NEGATIVE: 1
ALLERGIC/IMMUNOLOGIC NEGATIVE: 1
MYALGIAS: 1
CONSTITUTIONAL NEGATIVE: 1
DYSPHORIC MOOD: 0
ADENOPATHY: 0
LIGHT-HEADEDNESS: 0
BACK PAIN: 1
COUGH: 0
ARTHRALGIAS: 0
FACIAL ASYMMETRY: 0
ENDOCRINE NEGATIVE: 1
SHORTNESS OF BREATH: 0
BRUISES/BLEEDS EASILY: 0
WHEEZING: 0

## 2025-01-07 ASSESSMENT — COLUMBIA-SUICIDE SEVERITY RATING SCALE - C-SSRS
6. HAVE YOU EVER DONE ANYTHING, STARTED TO DO ANYTHING, OR PREPARED TO DO ANYTHING TO END YOUR LIFE?: NO
1. IN THE PAST MONTH, HAVE YOU WISHED YOU WERE DEAD OR WISHED YOU COULD GO TO SLEEP AND NOT WAKE UP?: NO
2. HAVE YOU ACTUALLY HAD ANY THOUGHTS OF KILLING YOURSELF?: NO

## 2025-01-07 NOTE — PROGRESS NOTES
Subjective   Patient ID: Vickie Vuong is a 49 y.o. female who presents for Pain (FUV for R low back pain with radiation down R leg to toes; 1/10 pain score today. Pain is described as a constant dull ache with varying degrees of intensity. Normal activity will cause or increase the pain. She hasn't found anything to help decrease the pain. She had a lumbar spine MRI 12-27-24 at . ). MMA signed. ANA = 12/100. COMM = 1. Depression and smoking screen negative. Falls screen N/A.   HPI    Review of Systems    Objective   Physical Exam    Assessment/Plan            Connie Dominguez RN 01/07/25 9:05 AM

## 2025-01-07 NOTE — PROGRESS NOTES
Subjective   Patient ID: Vickie Vuong is a 49 y.o. female who presents for Pain (FUV for R low back pain with radiation down R leg to toes; 1/10 pain score today. Pain is described as a constant dull ache with varying degrees of intensity. Normal activity will cause or increase the pain. She hasn't found anything to help decrease the pain. She had a lumbar spine MRI 12-27-24 at . ). MMA signed. Depression and smoking screens negative. Falls screen N/A. ANA = 12/100. SOAPP = 1.  Connie Dominguez RN 01/07/25 8:21 AM     The patient is a 49-year-old female presents today for a follow-up appointment to review her lumbar MRI imaging.  She currently rates her pain 1/10 in the right side of her lower back rating down the lateral aspect of her right leg to her foot with numbness.  She says occasionally she will get numbness in her left foot, but it is primarily her right side.  At her most recent appointment  her pregabalin was increased to 225 mg twice a day.  She has only been on this increased dose for about a week, denies side effects so far, but is wanting to give the increased dose more time before deciding how much or how little is helping.  She notes that she did miss a dose the other day, and noticed a significant difference in her pain level.  She is unsure if she wants to pursue injections at this time, as she has had numerous in the past without any long-term relief.        Review of Systems   Constitutional: Negative.    HENT: Negative.     Eyes: Negative.    Respiratory:  Negative for cough, shortness of breath and wheezing.    Cardiovascular:  Negative for chest pain, palpitations and leg swelling.   Endocrine: Negative.    Genitourinary: Negative.    Musculoskeletal:  Positive for back pain and myalgias. Negative for arthralgias.   Skin: Negative.    Allergic/Immunologic: Negative.    Neurological:  Negative for facial asymmetry, weakness and light-headedness.   Hematological:  Negative for adenopathy.  Does not bruise/bleed easily.   Psychiatric/Behavioral:  Negative for dysphoric mood and suicidal ideas.        Objective   Physical Exam  Constitutional:       General: She is not in acute distress.     Appearance: Normal appearance.   HENT:      Head: Normocephalic.      Mouth/Throat:      Mouth: Mucous membranes are moist.   Eyes:      Extraocular Movements: Extraocular movements intact.   Cardiovascular:      Rate and Rhythm: Normal rate and regular rhythm.      Pulses: Normal pulses.      Heart sounds: Normal heart sounds. No murmur heard.     No friction rub. No gallop.   Pulmonary:      Effort: Pulmonary effort is normal.      Breath sounds: Normal breath sounds. No wheezing, rhonchi or rales.   Abdominal:      General: Abdomen is flat.      Palpations: Abdomen is soft.   Musculoskeletal:      Cervical back: Normal range of motion.      Right lower leg: No edema.      Left lower leg: No edema.      Comments: Ambulates without assistance  Strength 5/5 BLE   Lymphadenopathy:      Cervical: No cervical adenopathy.   Skin:     General: Skin is warm and dry.   Neurological:      General: No focal deficit present.      Mental Status: She is alert and oriented to person, place, and time. Mental status is at baseline.   Psychiatric:         Mood and Affect: Mood normal.         Behavior: Behavior normal.         Assessment/Plan   Diagnoses and all orders for this visit:  Lumbosacral radiculopathy  S/P spinal fusion       The patient is a 49-year-old female with a past medical history significant for the above-mentioned problems.  We reviewed her MRI imaging together and discussed options based on the findings.  She does not want a pursue interventions at this time, she has been through many in the past without any long-term relief.  We discussed the possibility of referral to a surgeon, she would like time to think about this, and thinks she would like referred to Dr. Charan Leon if she would pursue a referral.   She will give us a call with what she decides.  She does not need refills of her pregabalin at this time.  PDMP reviewed.  She will follow-up in 3 months, call clinic sooner if needed.

## 2025-01-07 NOTE — PROGRESS NOTES
Subjective   Patient ID: Vickie Vuong is a 49 y.o. female who presents for Pain (FUV for R low back pain with radiation down R leg to toes; 1/10 pain score today. Pain is described as a constant dull ache with varying degrees of intensity. Normal activity will cause or increase the pain. She hasn't found anything to help decrease the pain. She had a lumbar spine MRI 12-27-24 at . ).  HPI    Review of Systems    Objective   Physical Exam    Assessment/Plan            Connie Dominguez RN 01/07/25 9:03 AM

## 2025-01-07 NOTE — PROGRESS NOTES
Subjective   Patient ID: Vickie Vuong is a 49 y.o. female who presents for Pain (FUV for R low back pain with radiation down R leg to toes; 1/10 pain score today. Pain is described as a constant dull ache with varying degrees of intensity. Normal activity will cause or increase the pain. She hasn't found anything to help decrease the pain. She had a lumbar spine MRI 12-27-24 at . ).  HPI    Review of Systems    Objective   Physical Exam    Assessment/Plan            Connie Dominguez RN 01/07/25 9:02 AM

## 2025-01-29 ENCOUNTER — TELEPHONE (OUTPATIENT)
Dept: PAIN MEDICINE | Facility: CLINIC | Age: 50
End: 2025-01-29
Payer: COMMERCIAL

## 2025-01-29 NOTE — TELEPHONE ENCOUNTER
Patient called requesting a referral to Dr. Charan Leon.  She states you had discussed it at her last office visit.  Thanks!

## 2025-01-30 DIAGNOSIS — M54.17 LUMBOSACRAL RADICULOPATHY: ICD-10-CM

## 2025-01-30 DIAGNOSIS — M43.10 ACQUIRED SPONDYLOLISTHESIS: Primary | ICD-10-CM

## 2025-01-30 DIAGNOSIS — M47.27 OSTEOARTHRITIS OF SPINE WITH RADICULOPATHY, LUMBOSACRAL REGION: ICD-10-CM

## 2025-02-01 ENCOUNTER — PHARMACY VISIT (OUTPATIENT)
Dept: PHARMACY | Facility: CLINIC | Age: 50
End: 2025-02-01
Payer: COMMERCIAL

## 2025-02-01 PROCEDURE — RXMED WILLOW AMBULATORY MEDICATION CHARGE

## 2025-02-22 ENCOUNTER — PHARMACY VISIT (OUTPATIENT)
Dept: PHARMACY | Facility: CLINIC | Age: 50
End: 2025-02-22
Payer: COMMERCIAL

## 2025-02-22 PROCEDURE — RXMED WILLOW AMBULATORY MEDICATION CHARGE

## 2025-02-24 DIAGNOSIS — J45.30 MILD PERSISTENT ASTHMA WITHOUT COMPLICATION (HHS-HCC): ICD-10-CM

## 2025-02-24 PROCEDURE — RXMED WILLOW AMBULATORY MEDICATION CHARGE

## 2025-02-24 RX ORDER — MONTELUKAST SODIUM 10 MG/1
10 TABLET ORAL NIGHTLY
Qty: 90 TABLET | Refills: 3 | Status: SHIPPED | OUTPATIENT
Start: 2025-02-24 | End: 2026-02-24

## 2025-02-25 PROCEDURE — RXMED WILLOW AMBULATORY MEDICATION CHARGE

## 2025-03-01 ENCOUNTER — PHARMACY VISIT (OUTPATIENT)
Dept: PHARMACY | Facility: CLINIC | Age: 50
End: 2025-03-01
Payer: COMMERCIAL

## 2025-03-01 PROCEDURE — RXOTC WILLOW AMBULATORY OTC CHARGE

## 2025-03-03 ENCOUNTER — TELEPHONE (OUTPATIENT)
Dept: PAIN MEDICINE | Facility: CLINIC | Age: 50
End: 2025-03-03
Payer: COMMERCIAL

## 2025-03-03 DIAGNOSIS — M62.838 MUSCLE SPASM: Primary | ICD-10-CM

## 2025-03-03 PROCEDURE — RXMED WILLOW AMBULATORY MEDICATION CHARGE

## 2025-03-03 RX ORDER — METHYLPREDNISOLONE 4 MG/1
TABLET ORAL
Qty: 21 TABLET | Refills: 0 | Status: SHIPPED | OUTPATIENT
Start: 2025-03-03

## 2025-03-03 NOTE — TELEPHONE ENCOUNTER
"Patient called stating \"over the weekend I pulled my back and tweaked it\".  She is asking for a round of steroids.  Last appointment 12/18/25.  Please advise.  Thanks!  "

## 2025-03-05 ENCOUNTER — PHARMACY VISIT (OUTPATIENT)
Dept: PHARMACY | Facility: CLINIC | Age: 50
End: 2025-03-05
Payer: COMMERCIAL

## 2025-03-17 ENCOUNTER — APPOINTMENT (OUTPATIENT)
Dept: PRIMARY CARE | Facility: CLINIC | Age: 50
End: 2025-03-17
Payer: COMMERCIAL

## 2025-03-17 VITALS
HEART RATE: 105 BPM | DIASTOLIC BLOOD PRESSURE: 87 MMHG | WEIGHT: 219 LBS | HEIGHT: 64 IN | BODY MASS INDEX: 37.39 KG/M2 | SYSTOLIC BLOOD PRESSURE: 138 MMHG

## 2025-03-17 DIAGNOSIS — M54.17 LUMBOSACRAL RADICULOPATHY: ICD-10-CM

## 2025-03-17 DIAGNOSIS — Z98.1 S/P SPINAL FUSION: ICD-10-CM

## 2025-03-17 DIAGNOSIS — M47.27 OSTEOARTHRITIS OF SPINE WITH RADICULOPATHY, LUMBOSACRAL REGION: Primary | ICD-10-CM

## 2025-03-17 PROCEDURE — RXMED WILLOW AMBULATORY MEDICATION CHARGE

## 2025-03-17 PROCEDURE — 3075F SYST BP GE 130 - 139MM HG: CPT | Performed by: INTERNAL MEDICINE

## 2025-03-17 PROCEDURE — 3008F BODY MASS INDEX DOCD: CPT | Performed by: INTERNAL MEDICINE

## 2025-03-17 PROCEDURE — 99213 OFFICE O/P EST LOW 20 MIN: CPT | Performed by: INTERNAL MEDICINE

## 2025-03-17 PROCEDURE — 3079F DIAST BP 80-89 MM HG: CPT | Performed by: INTERNAL MEDICINE

## 2025-03-17 PROCEDURE — 1036F TOBACCO NON-USER: CPT | Performed by: INTERNAL MEDICINE

## 2025-03-17 ASSESSMENT — ENCOUNTER SYMPTOMS
NECK STIFFNESS: 0
CONSTITUTIONAL NEGATIVE: 1
FEVER: 0
BACK PAIN: 1
HEMATOLOGIC/LYMPHATIC NEGATIVE: 1
WHEEZING: 0
EYES NEGATIVE: 1
ADENOPATHY: 0
DYSURIA: 0
PALPITATIONS: 0
COUGH: 0
GASTROINTESTINAL NEGATIVE: 1
CONFUSION: 0
NAUSEA: 0
HEADACHES: 0
ALLERGIC/IMMUNOLOGIC NEGATIVE: 1
ABDOMINAL PAIN: 0
CARDIOVASCULAR NEGATIVE: 1
NUMBNESS: 0
CONSTIPATION: 0
PSYCHIATRIC NEGATIVE: 1
AGITATION: 0
SHORTNESS OF BREATH: 0
JOINT SWELLING: 0
ENDOCRINE NEGATIVE: 1
DIARRHEA: 0
LIGHT-HEADEDNESS: 0
VOMITING: 0
RESPIRATORY NEGATIVE: 1
ABDOMINAL DISTENTION: 0
CHILLS: 0
EYE DISCHARGE: 0
NEUROLOGICAL NEGATIVE: 1

## 2025-03-17 NOTE — PROGRESS NOTES
Subjective   Patient ID: Vickie Vuong is a 49 y.o. female who presents for New Patient Visit (Acupuncture consult for low back pain).  Chronic LBP  Surgery x 2  See pain management        Review of Systems   Constitutional: Negative.  Negative for chills and fever.   HENT: Negative.  Negative for congestion.    Eyes: Negative.  Negative for discharge.   Respiratory: Negative.  Negative for cough, shortness of breath and wheezing.    Cardiovascular: Negative.  Negative for chest pain, palpitations and leg swelling.   Gastrointestinal: Negative.  Negative for abdominal distention, abdominal pain, constipation, diarrhea, nausea and vomiting.   Endocrine: Negative.    Genitourinary: Negative.  Negative for dysuria and urgency.   Musculoskeletal:  Positive for back pain. Negative for joint swelling and neck stiffness.   Skin: Negative.  Negative for rash.   Allergic/Immunologic: Negative.  Negative for immunocompromised state.   Neurological: Negative.  Negative for light-headedness, numbness and headaches.   Hematological: Negative.  Negative for adenopathy.   Psychiatric/Behavioral: Negative.  Negative for agitation, behavioral problems and confusion.    All other systems reviewed and are negative.      Objective   Physical Exam  Vitals reviewed.   Constitutional:       General: She is not in acute distress.     Appearance: Normal appearance.   HENT:      Head: Normocephalic and atraumatic.      Nose: Nose normal.   Eyes:      Conjunctiva/sclera: Conjunctivae normal.      Pupils: Pupils are equal, round, and reactive to light.   Neck:      Vascular: No carotid bruit.   Cardiovascular:      Rate and Rhythm: Normal rate and regular rhythm.      Pulses: Normal pulses.      Heart sounds:      No gallop.   Pulmonary:      Effort: Pulmonary effort is normal. No respiratory distress.      Breath sounds: Normal breath sounds. No wheezing.   Abdominal:      General: Bowel sounds are normal.      Palpations: Abdomen is soft.  "     Tenderness: There is no abdominal tenderness.   Musculoskeletal:         General: Tenderness (lower back, LST -) present. Normal range of motion.      Cervical back: Normal range of motion. No rigidity.   Lymphadenopathy:      Cervical: No cervical adenopathy.   Skin:     General: Skin is warm.      Findings: No rash.   Neurological:      General: No focal deficit present.      Mental Status: She is alert and oriented to person, place, and time.   Psychiatric:         Mood and Affect: Mood normal.         Behavior: Behavior normal.       /87 (BP Location: Right arm, Patient Position: Sitting)   Pulse 105   Ht 1.626 m (5' 4\")   Wt 99.3 kg (219 lb)   BMI 37.59 kg/m²    Hemoglobin A1C   Date/Time Value Ref Range Status   09/30/2024 06:46 AM 5.0 See comment % Final     Assessment/Plan   Problem List Items Addressed This Visit       Lumbosacral radiculopathy    Lumbosacral spondylosis - Primary    S/P spinal fusion        MRI dw pt      Acupuncture chip  "

## 2025-03-22 ENCOUNTER — HOSPITAL ENCOUNTER (OUTPATIENT)
Dept: RADIOLOGY | Facility: HOSPITAL | Age: 50
Discharge: HOME | End: 2025-03-22
Payer: COMMERCIAL

## 2025-03-22 ENCOUNTER — PHARMACY VISIT (OUTPATIENT)
Dept: PHARMACY | Facility: CLINIC | Age: 50
End: 2025-03-22
Payer: COMMERCIAL

## 2025-03-22 DIAGNOSIS — M25.561 RIGHT KNEE PAIN, UNSPECIFIED CHRONICITY: ICD-10-CM

## 2025-03-25 ENCOUNTER — APPOINTMENT (OUTPATIENT)
Dept: ORTHOPEDIC SURGERY | Facility: CLINIC | Age: 50
End: 2025-03-25
Payer: COMMERCIAL

## 2025-03-26 DIAGNOSIS — M43.10 ACQUIRED SPONDYLOLISTHESIS: ICD-10-CM

## 2025-03-26 DIAGNOSIS — M54.17 LUMBOSACRAL RADICULOPATHY: ICD-10-CM

## 2025-03-26 DIAGNOSIS — Z98.1 S/P SPINAL FUSION: ICD-10-CM

## 2025-03-26 PROCEDURE — RXMED WILLOW AMBULATORY MEDICATION CHARGE

## 2025-03-26 RX ORDER — PREGABALIN 225 MG/1
225 CAPSULE ORAL 2 TIMES DAILY
Qty: 60 CAPSULE | Refills: 0 | Status: SHIPPED | OUTPATIENT
Start: 2025-03-26

## 2025-03-28 ENCOUNTER — PHARMACY VISIT (OUTPATIENT)
Dept: PHARMACY | Facility: CLINIC | Age: 50
End: 2025-03-28
Payer: COMMERCIAL

## 2025-04-02 ENCOUNTER — APPOINTMENT (OUTPATIENT)
Dept: PRIMARY CARE | Facility: CLINIC | Age: 50
End: 2025-04-02
Payer: COMMERCIAL

## 2025-04-03 ENCOUNTER — OFFICE VISIT (OUTPATIENT)
Dept: PAIN MEDICINE | Facility: CLINIC | Age: 50
End: 2025-04-03
Payer: COMMERCIAL

## 2025-04-03 VITALS — RESPIRATION RATE: 16 BRPM | DIASTOLIC BLOOD PRESSURE: 76 MMHG | SYSTOLIC BLOOD PRESSURE: 118 MMHG | HEART RATE: 80 BPM

## 2025-04-03 DIAGNOSIS — M43.10 ACQUIRED SPONDYLOLISTHESIS: Primary | ICD-10-CM

## 2025-04-03 DIAGNOSIS — Z98.1 S/P SPINAL FUSION: ICD-10-CM

## 2025-04-03 DIAGNOSIS — M54.17 LUMBOSACRAL RADICULOPATHY: ICD-10-CM

## 2025-04-03 DIAGNOSIS — M47.27 OSTEOARTHRITIS OF SPINE WITH RADICULOPATHY, LUMBOSACRAL REGION: ICD-10-CM

## 2025-04-03 PROCEDURE — 99213 OFFICE O/P EST LOW 20 MIN: CPT | Performed by: PHYSICIAN ASSISTANT

## 2025-04-03 RX ORDER — PREGABALIN 225 MG/1
225 CAPSULE ORAL 2 TIMES DAILY
Qty: 60 CAPSULE | Refills: 2 | Status: SHIPPED | OUTPATIENT
Start: 2025-04-03

## 2025-04-03 ASSESSMENT — ENCOUNTER SYMPTOMS
ALLERGIC/IMMUNOLOGIC NEGATIVE: 1
ENDOCRINE NEGATIVE: 1
PSYCHIATRIC NEGATIVE: 1
EYES NEGATIVE: 1
CARDIOVASCULAR NEGATIVE: 1
ARTHRALGIAS: 1
RESPIRATORY NEGATIVE: 1
BACK PAIN: 1
MYALGIAS: 1
CONSTITUTIONAL NEGATIVE: 1
WEAKNESS: 1
NUMBNESS: 1
GASTROINTESTINAL NEGATIVE: 1
HEMATOLOGIC/LYMPHATIC NEGATIVE: 1

## 2025-04-03 ASSESSMENT — COLUMBIA-SUICIDE SEVERITY RATING SCALE - C-SSRS
2. HAVE YOU ACTUALLY HAD ANY THOUGHTS OF KILLING YOURSELF?: NO
6. HAVE YOU EVER DONE ANYTHING, STARTED TO DO ANYTHING, OR PREPARED TO DO ANYTHING TO END YOUR LIFE?: NO
1. IN THE PAST MONTH, HAVE YOU WISHED YOU WERE DEAD OR WISHED YOU COULD GO TO SLEEP AND NOT WAKE UP?: NO

## 2025-04-03 NOTE — PROGRESS NOTES
Subjective   Patient ID: Vickie Vuong is a 49 y.o. female who presents for Med Management (FOLLOW UP ON LYRICA, SHE IS STABLE ON CURRENT DOSE OF LYRICA, REFILL NEEDED, ONGOING BILATERAL HIP PAIN, THE PAIN IS JUST THERE A GNAWING ACHE IRRITATION, MORE DISCOMFORT AFTER ACTIVITY, BENDING, REP MOTION, YARD WORK PICKING UP STICKS, ADL, SHE USES HEAT FOR RELIEF, ICE PRN, SITTING FOR RELIEF, ). PAIN SCORE 1/10, ANA=18%, ETOH NO    Marley Shaikh, CMA 04/03/25 8:19 AM     Patient is a 49-year-old female.  She presents today for a follow-up after  increasing Lyrica.  225 mg twice daily.  She states that it is not perfect but it is better than before.  She has a history of L5-S1 ALIF on 7/15/15 done by Dr. Gianni Cordova and then a posterior lumbar fusion L4-S1 in 2017 by Dr. Humza Boyd.  She does not want to have any injections at this time, she states that things not perfect and it can sometimes flareup her right now, her pain is a 1/10.  Does get worse with certain activities.  She is a history of therapy after her surgeries.  She continues to do the home exercises that were taught to her.  She is trying to be upright and be more active.        Review of Systems   Constitutional: Negative.    HENT: Negative.     Eyes: Negative.    Respiratory: Negative.     Cardiovascular: Negative.    Gastrointestinal: Negative.    Endocrine: Negative.    Genitourinary: Negative.    Musculoskeletal:  Positive for arthralgias, back pain, gait problem and myalgias.   Skin: Negative.    Allergic/Immunologic: Negative.    Neurological:  Positive for weakness and numbness.   Hematological: Negative.    Psychiatric/Behavioral: Negative.         Objective   Physical Exam  Vitals and nursing note reviewed.   Constitutional:       General: She is not in acute distress.     Appearance: Normal appearance. She is not ill-appearing.   HENT:      Head: Normocephalic and atraumatic.      Right Ear: External ear normal.      Left Ear: External ear normal.       Nose: Nose normal.      Mouth/Throat:      Pharynx: Oropharynx is clear.   Eyes:      Conjunctiva/sclera: Conjunctivae normal.   Cardiovascular:      Rate and Rhythm: Normal rate and regular rhythm.      Pulses: Normal pulses.   Pulmonary:      Effort: Pulmonary effort is normal.      Breath sounds: Normal breath sounds.   Musculoskeletal:         General: Normal range of motion.      Cervical back: Normal range of motion.   Skin:     General: Skin is warm and dry.   Neurological:      General: No focal deficit present.      Mental Status: She is alert and oriented to person, place, and time. Mental status is at baseline.   Psychiatric:         Mood and Affect: Mood normal.         Behavior: Behavior normal.         Thought Content: Thought content normal.         Judgment: Judgment normal.         Assessment/Plan   Diagnoses and all orders for this visit:  Acquired spondylolisthesis  -     pregabalin (Lyrica) 225 mg capsule; Take 1 capsule (225 mg) by mouth 2 times a day.  Lumbosacral radiculopathy  -     pregabalin (Lyrica) 225 mg capsule; Take 1 capsule (225 mg) by mouth 2 times a day.  Osteoarthritis of spine with radiculopathy, lumbosacral region  S/P spinal fusion  -     pregabalin (Lyrica) 225 mg capsule; Take 1 capsule (225 mg) by mouth 2 times a day.       Patient is a 49-year-old female with the above-mentioned medical diagnoses.  She is following up today for medication management.  She is using Lyrica.  225 mg twice daily.  Tolerating this well.  It does help her.  She has not coming appointment with the surgeon.  She wants to see what they say.  At this time, she is going to continue on the medication and follow-up in 3 months.  She will call us in the interim should she require anything from our services.  OARRS reviewed.  Refill sent.

## 2025-04-08 ENCOUNTER — HOSPITAL ENCOUNTER (OUTPATIENT)
Dept: RADIOLOGY | Facility: EXTERNAL LOCATION | Age: 50
Discharge: HOME | End: 2025-04-08

## 2025-04-08 ENCOUNTER — HOSPITAL ENCOUNTER (OUTPATIENT)
Dept: RADIOLOGY | Facility: CLINIC | Age: 50
Discharge: HOME | End: 2025-04-08
Payer: COMMERCIAL

## 2025-04-08 ENCOUNTER — APPOINTMENT (OUTPATIENT)
Dept: ORTHOPEDIC SURGERY | Facility: CLINIC | Age: 50
End: 2025-04-08
Payer: COMMERCIAL

## 2025-04-08 DIAGNOSIS — M25.561 RIGHT KNEE PAIN, UNSPECIFIED CHRONICITY: ICD-10-CM

## 2025-04-08 PROCEDURE — 73564 X-RAY EXAM KNEE 4 OR MORE: CPT | Mod: RT

## 2025-04-08 PROCEDURE — 76882 US LMTD JT/FCL EVL NVASC XTR: CPT | Performed by: SPECIALIST

## 2025-04-08 PROCEDURE — 99214 OFFICE O/P EST MOD 30 MIN: CPT | Performed by: SPECIALIST

## 2025-04-08 PROCEDURE — 1036F TOBACCO NON-USER: CPT | Performed by: SPECIALIST

## 2025-04-08 PROCEDURE — 73564 X-RAY EXAM KNEE 4 OR MORE: CPT | Mod: RIGHT SIDE | Performed by: RADIOLOGY

## 2025-04-08 RX ORDER — DICLOFENAC SODIUM 10 MG/G
4 GEL TOPICAL 4 TIMES DAILY PRN
Qty: 100 G | Refills: 1 | Status: SHIPPED | OUTPATIENT
Start: 2025-04-08

## 2025-04-08 ASSESSMENT — PAIN SCALES - GENERAL: PAINLEVEL_OUTOF10: 2

## 2025-04-08 ASSESSMENT — PAIN - FUNCTIONAL ASSESSMENT: PAIN_FUNCTIONAL_ASSESSMENT: 0-10

## 2025-04-08 NOTE — PROGRESS NOTES
Assessment/Plan   Encounter Diagnoses:  Right knee pain, unspecified chronicity  Right knee pain  Assessment PF Arthritis    Plan  PT  Jose Manuel  Fu 6-8 weeks       Subjective    Patient ID: Vickie Vuong is a 49 y.o. female.    Chief Complaint: Pain of the Right Knee (X-RAYS 4-8-25/NO INJECTIONS/NO PHYSICAL THERAPY)     Last Surgery: No surgery found  Last Surgery Date: No surgery found    HPI  50 yo with PF pain.  She is a spin cyclist.  Pain with climbing.    OBJECTIVE: ORTHO EXAM  Right knee    Tender to palp at PF  Scant effussion  NV intact distally  No lig laxity  Slight min tenderness over med joint line.    IMAGE RESULTS:  Point of Care Ultrasound  These images are not reportable by radiology and will not be interpreted   by  Radiologists.      ULTRASOUND  Template not available.    Scant effusion.    Procedures     Orders Placed This Encounter    Point of Care Ultrasound    Referral to Physical Therapy

## 2025-04-10 ENCOUNTER — APPOINTMENT (OUTPATIENT)
Dept: PRIMARY CARE | Facility: CLINIC | Age: 50
End: 2025-04-10
Payer: COMMERCIAL

## 2025-04-10 ENCOUNTER — APPOINTMENT (OUTPATIENT)
Dept: OBSTETRICS AND GYNECOLOGY | Facility: CLINIC | Age: 50
End: 2025-04-10
Payer: COMMERCIAL

## 2025-04-10 ENCOUNTER — OFFICE VISIT (OUTPATIENT)
Dept: PRIMARY CARE | Facility: CLINIC | Age: 50
End: 2025-04-10
Payer: COMMERCIAL

## 2025-04-10 VITALS — SYSTOLIC BLOOD PRESSURE: 139 MMHG | DIASTOLIC BLOOD PRESSURE: 82 MMHG | HEART RATE: 92 BPM

## 2025-04-10 DIAGNOSIS — M54.17 LUMBOSACRAL RADICULOPATHY: Primary | ICD-10-CM

## 2025-04-10 PROCEDURE — 3079F DIAST BP 80-89 MM HG: CPT | Performed by: INTERNAL MEDICINE

## 2025-04-10 PROCEDURE — 3075F SYST BP GE 130 - 139MM HG: CPT | Performed by: INTERNAL MEDICINE

## 2025-04-10 PROCEDURE — 97813 ACUP 1/> W/ESTIM 1ST 15 MIN: CPT | Performed by: INTERNAL MEDICINE

## 2025-04-10 PROCEDURE — 1036F TOBACCO NON-USER: CPT | Performed by: INTERNAL MEDICINE

## 2025-04-10 PROCEDURE — 97814 ACUP 1/> W/ESTIM EA ADDL 15: CPT | Performed by: INTERNAL MEDICINE

## 2025-04-10 ASSESSMENT — PATIENT HEALTH QUESTIONNAIRE - PHQ9
SUM OF ALL RESPONSES TO PHQ9 QUESTIONS 1 AND 2: 0
2. FEELING DOWN, DEPRESSED OR HOPELESS: NOT AT ALL
1. LITTLE INTEREST OR PLEASURE IN DOING THINGS: NOT AT ALL

## 2025-04-10 NOTE — PROGRESS NOTES
HERE FOR ACUPUNCTURE FOR LBP    LOW BACK PENS 1.3 HTZ  BL60 , GB34 GV20    RIGHT EAR: JUSTIN MEN, CG , LUMBAR, LR    Time: 30 minutes    Problem List Items Addressed This Visit       Lumbosacral radiculopathy - Primary         F/U 2 w

## 2025-04-14 ENCOUNTER — OFFICE VISIT (OUTPATIENT)
Facility: CLINIC | Age: 50
End: 2025-04-14
Payer: COMMERCIAL

## 2025-04-14 VITALS
BODY MASS INDEX: 35.65 KG/M2 | SYSTOLIC BLOOD PRESSURE: 118 MMHG | DIASTOLIC BLOOD PRESSURE: 70 MMHG | TEMPERATURE: 97.3 F | HEIGHT: 65 IN | WEIGHT: 214 LBS

## 2025-04-14 DIAGNOSIS — M43.10 ACQUIRED SPONDYLOLISTHESIS: ICD-10-CM

## 2025-04-14 DIAGNOSIS — M54.17 LUMBOSACRAL RADICULOPATHY: ICD-10-CM

## 2025-04-14 DIAGNOSIS — M47.27 OSTEOARTHRITIS OF SPINE WITH RADICULOPATHY, LUMBOSACRAL REGION: ICD-10-CM

## 2025-04-14 PROCEDURE — 3078F DIAST BP <80 MM HG: CPT | Performed by: STUDENT IN AN ORGANIZED HEALTH CARE EDUCATION/TRAINING PROGRAM

## 2025-04-14 PROCEDURE — 99204 OFFICE O/P NEW MOD 45 MIN: CPT | Performed by: STUDENT IN AN ORGANIZED HEALTH CARE EDUCATION/TRAINING PROGRAM

## 2025-04-14 PROCEDURE — 3008F BODY MASS INDEX DOCD: CPT | Performed by: STUDENT IN AN ORGANIZED HEALTH CARE EDUCATION/TRAINING PROGRAM

## 2025-04-14 PROCEDURE — 99214 OFFICE O/P EST MOD 30 MIN: CPT | Performed by: STUDENT IN AN ORGANIZED HEALTH CARE EDUCATION/TRAINING PROGRAM

## 2025-04-14 PROCEDURE — 3074F SYST BP LT 130 MM HG: CPT | Performed by: STUDENT IN AN ORGANIZED HEALTH CARE EDUCATION/TRAINING PROGRAM

## 2025-04-14 PROCEDURE — 1036F TOBACCO NON-USER: CPT | Performed by: STUDENT IN AN ORGANIZED HEALTH CARE EDUCATION/TRAINING PROGRAM

## 2025-04-14 ASSESSMENT — PATIENT HEALTH QUESTIONNAIRE - PHQ9
1. LITTLE INTEREST OR PLEASURE IN DOING THINGS: NOT AT ALL
SUM OF ALL RESPONSES TO PHQ9 QUESTIONS 1 & 2: 0
2. FEELING DOWN, DEPRESSED OR HOPELESS: NOT AT ALL

## 2025-04-14 ASSESSMENT — PAIN SCALES - GENERAL: PAINLEVEL_OUTOF10: 2

## 2025-04-14 NOTE — PROGRESS NOTES
[facility] [place]  Department of Neurological Surgery  New Patient Visit    History of Present Illness:  Vickie Vuong is a 49 y.o. year old female who presents to the spine clinic with persistent chronic low back pain. The pain radiates from the low back into the bilateral hips and posterior aspect of the legs. Her symptoms are mainly in the L4 distribution of the right leg. She has L4 radiculopathy, right worse than left. The more she does, the more she hurts. Resting makes it better. She has been seeing pain management. She had numerous injections over the years and also tried medications, therapy, and acupuncture. At this time her pain is a 1/10, she describes it as more of an annoying and sore ache. She has a history of L5-S1 ALIF on 7/15/15 done by Dr. Gianni Cordova and then a posterior lumbar fusion L4-S1 in 2017 by Dr. Humza Boyd. She is a history of therapy after her surgeries. She continues to do the home exercises that were taught to her. She is trying to be upright and be more active. She had her Lyrica increased to 225 mg twice daily and is tolerating it well and it is helping her. She works for [employer] and is working from home.    Prior Spine Surgeries: L5-S1 ALIF on 7/15/15 done by Dr. Gianni Cordova and then a posterior lumbar fusion L4-S1 in 2017 by Dr. Humza Boyd    Physical Therapy: yes    Diabetic: no     Osteoporosis: no  No DXA results found for the past 12 months    Patient's BMI is Body mass index is 35.61 kg/m².    Review of Systems:  14/14 systems reviewed and negative other than what is listed in the history of present illness    Patient Active Problem List   Diagnosis    Anxiety    Right knee pain    Diverticulitis of colon    Diverticulosis of colon    Hypertension, essential    Lumbosacral radiculopathy    Insomnia, persistent    Lumbosacral spondylosis    Mild persistent asthma without complication ([facility])    Obesity    Migraine without aura and without status migrainosus, not  intractable    Epicondylitis, lateral, left    Acquired spondylolisthesis    S/P spinal fusion     Past Medical History:   Diagnosis Date    Personal history of other medical treatment 08/30/2022    History of mammogram     Past Surgical History:   Procedure Laterality Date    FL GUIDED INJECTION HIP LEFT Left 11/21/2023    FL GUIDED INJECTION HIP LEFT 11/21/2023 [facility]    HIP SURGERY Left     Lt Flex muscle    OTHER SURGICAL HISTORY  10/24/2019    Back surgery    OTHER SURGICAL HISTORY  10/24/2019    Hysterectomy    OTHER SURGICAL HISTORY  10/24/2019    Appendectomy    OTHER SURGICAL HISTORY  10/24/2019    Cholecystectomy    OTHER SURGICAL HISTORY  10/02/2020    Epidural steroid injection     Social History     Tobacco Use    Smoking status: Never    Smokeless tobacco: Never   Substance Use Topics    Alcohol use: Yes     Comment: socially     family history includes No Known Problems in her father and mother.    Current Outpatient Medications:     amitriptyline (Elavil) 50 mg tablet, Take 1 tablet (50 mg) by mouth once daily at bedtime., Disp: 30 tablet, Rfl: 11    celecoxib (CeleBREX) 200 mg capsule, TAKE 1 CAPSULE (200 MG) BY MOUTH 2 TIMES A DAY., Disp: 180 capsule, Rfl: 3    dilTIAZem CD (Cardizem CD) 180 mg 24 hr capsule, Take 1 capsule (180 mg) by mouth once daily., Disp: 90 capsule, Rfl: 3    montelukast (Singulair) 10 mg tablet, TAKE 1 TABLET BY MOUTH EVERY NIGHT, Disp: 90 tablet, Rfl: 3    pregabalin (Lyrica) 225 mg capsule, Take 1 capsule (225 mg) by mouth 2 times a day., Disp: 60 capsule, Rfl: 2    SUMAtriptan (Imitrex) 100 mg tablet, Take 1 tablet (100 mg) by mouth 1 time if needed for migraine. May repeat after 2 hours., Disp: 9 tablet, Rfl: 5    diclofenac sodium (Voltaren) 1 % gel, Apply 4.5 inches (4 g) topically 4 times a day as needed (AS NEEDED FOR PAIN). (Patient not taking: Reported on 4/14/2025), Disp: 100 g, Rfl: 1    ibuprofen 600 mg tablet, Take 1 tablet (600 mg) by mouth every 8 hours  if needed for mild pain (1 - 3). (Patient not taking: Reported on 4/14/2025), Disp: 28 tablet, Rfl: 1    methylPREDNISolone (Medrol Dospak) 4 mg tablets, Follow schedule on package instructions (Patient not taking: Reported on 4/14/2025), Disp: 21 tablet, Rfl: 0  Allergies   Allergen Reactions    Morphine Hallucinations and Other     hallucinations       Physical Examination    General: Well developed, awake/alert/oriented x3, no distress, alert and cooperative  Skin: Warm and dry, no lesions, no rashes  ENMT: Mucous membranes moist, no apparent injury, no lesions seen  Head/Neck: Neck Supple, no apparent injury  Respiratory/Thorax: Normal breath sounds with good chest expansion, thorax symmetric  Cardiovascular: No pitting edema, no JVD    Motor Strength: 5/5 Throughout all extremities    Muscle Bulk: Normal and symmetric in all extremities    Posture:   -- Cervical: Normal  -- Thoracic: Normal  -- Lumbar : Normal  Paraspinal muscle spasm/tenderness absent.     Sensation: intact to light touch    Chronic low back pain  Bilateral hip and posterior leg pain, right worse than left    Results    I personally reviewed and interpreted the imaging results which included XR and MRI L spine showing a prior L4-S1 fusion with a mobile L3-4 spondylolisthesis. High pelvic incidence of approximately 70 degrees and has lost her normal lordosis and is putting extra stress on the L3-4 disc space on dynamic imaging.      Assessment and Plan:    Vickie Vuong is a 49 y.o. year old female who presents to the spine clinic with persistent chronic low back pain. The pain radiates from the low back into the bilateral hips and posterior aspect of the legs. Her symptoms are mainly in the L4 distribution of the right leg. She has L4 radiculopathy, right worse than left. The more she does, the more she hurts. Resting makes it better. She has been seeing pain management. She had numerous injections over the years and also tried medications,  therapy, and acupuncture. At this time her pain is a 1/10, she describes it as more of an annoying and sore ache. She has a history of L5-S1 ALIF on 7/15/15 done by Dr. Gianni Cordova and then a posterior lumbar fusion L4-S1 in 2017 by Dr. Humza Boyd. She is a history of therapy after her surgeries. She continues to do the home exercises that were taught to her. She is trying to be upright and be more active. She had her Lyrica increased to 225 mg twice daily and is tolerating it well and it is helping her. She works for  and is working from home.    At this time She will continue trying to live with her symptoms and follow up with me as needed.      I have reviewed all prior documentation and reviewed the electronic medical record since admission. I have personally have reviewed all advanced imaging not just the reports and used my interpretation as documented as the relevant findings. I have reviewed the risks and benefits of all treatment recommendations listed in this note with the patient and family.       The above clinical summary has been dictated with voice recognition software. It has not been proofread for grammatical errors, typographical mistakes, or other semantic inconsistencies.    Thank you for visiting our office today. It was our pleasure to take part in your healthcare.     Do not hesitate to call with any questions regarding your plan of care after leaving at [phone] M-F 8am-4pm.     To clinicians, thank you very much for this kind referral. It is a privilege to partner with you in the care of your patients. My office would be delighted to assist you with any further consultations or with questions regarding the plan of care outlined. Do not hesitate to call the office or contact me directly.       Sincerely,      Charan Leon MD, [facility]  Spine , [facility]  [facility] TSE and Kori Stein Chair in Spinal Neurosurgery  Complex Spine Surgery  Fellowship Director   of Neurological Surgery  [facility] [employer]  Phone: [phone]  Fax: [phone]        Scribe Attestation  By signing my name below, I, Toño Fontaine   attest that this documentation has been prepared under the direction and in the presence of Charan Leon MD.

## 2025-04-23 ENCOUNTER — APPOINTMENT (OUTPATIENT)
Dept: PHYSICAL THERAPY | Facility: CLINIC | Age: 50
End: 2025-04-23
Payer: COMMERCIAL

## 2025-04-28 ENCOUNTER — APPOINTMENT (OUTPATIENT)
Dept: PRIMARY CARE | Facility: CLINIC | Age: 50
End: 2025-04-28
Payer: COMMERCIAL

## 2025-05-06 ENCOUNTER — APPOINTMENT (OUTPATIENT)
Dept: PHYSICAL THERAPY | Facility: CLINIC | Age: 50
End: 2025-05-06
Payer: COMMERCIAL

## 2025-05-13 ENCOUNTER — APPOINTMENT (OUTPATIENT)
Dept: PRIMARY CARE | Facility: CLINIC | Age: 50
End: 2025-05-13
Payer: COMMERCIAL

## 2025-05-13 VITALS — SYSTOLIC BLOOD PRESSURE: 134 MMHG | HEART RATE: 89 BPM | DIASTOLIC BLOOD PRESSURE: 91 MMHG

## 2025-05-13 DIAGNOSIS — M54.17 LUMBOSACRAL RADICULOPATHY: Primary | ICD-10-CM

## 2025-05-13 DIAGNOSIS — M47.27 OSTEOARTHRITIS OF SPINE WITH RADICULOPATHY, LUMBOSACRAL REGION: ICD-10-CM

## 2025-05-13 PROCEDURE — 3080F DIAST BP >= 90 MM HG: CPT | Performed by: INTERNAL MEDICINE

## 2025-05-13 PROCEDURE — 3075F SYST BP GE 130 - 139MM HG: CPT | Performed by: INTERNAL MEDICINE

## 2025-05-13 PROCEDURE — 97813 ACUP 1/> W/ESTIM 1ST 15 MIN: CPT | Performed by: INTERNAL MEDICINE

## 2025-05-13 PROCEDURE — 1036F TOBACCO NON-USER: CPT | Performed by: INTERNAL MEDICINE

## 2025-05-13 PROCEDURE — 97814 ACUP 1/> W/ESTIM EA ADDL 15: CPT | Performed by: INTERNAL MEDICINE

## 2025-05-13 NOTE — PROGRESS NOTES
HERE FOR ACUPUNCTURE FOR LBP FEELS BETTER    LOW BACK PENS 1.3 HTZ  BL60 , GV20, GB34    LEFT EAR: MARGOTH MEN, CG , LUMBAR, LR    Time: 30 minutes    Problem List Items Addressed This Visit       Lumbosacral radiculopathy - Primary    Lumbosacral spondylosis         F/U 1 MO

## 2025-05-15 DIAGNOSIS — T75.3XXA MOTION SICKNESS, INITIAL ENCOUNTER: Primary | ICD-10-CM

## 2025-05-15 PROCEDURE — RXMED WILLOW AMBULATORY MEDICATION CHARGE

## 2025-05-15 RX ORDER — SCOPOLAMINE 1 MG/3D
1 PATCH, EXTENDED RELEASE TRANSDERMAL
Qty: 4 PATCH | Refills: 2 | Status: SHIPPED | OUTPATIENT
Start: 2025-05-15 | End: 2025-07-14

## 2025-05-16 DIAGNOSIS — G43.009 MIGRAINE WITHOUT AURA AND WITHOUT STATUS MIGRAINOSUS, NOT INTRACTABLE: ICD-10-CM

## 2025-05-16 DIAGNOSIS — I10 HYPERTENSION, ESSENTIAL: ICD-10-CM

## 2025-05-16 PROCEDURE — RXMED WILLOW AMBULATORY MEDICATION CHARGE

## 2025-05-16 RX ORDER — DILTIAZEM HYDROCHLORIDE 180 MG/1
180 CAPSULE, COATED, EXTENDED RELEASE ORAL DAILY
Qty: 90 CAPSULE | Refills: 3 | Status: SHIPPED | OUTPATIENT
Start: 2025-05-16 | End: 2026-05-16

## 2025-05-16 RX ORDER — AMITRIPTYLINE HYDROCHLORIDE 50 MG/1
50 TABLET, FILM COATED ORAL NIGHTLY
Qty: 30 TABLET | Refills: 11 | Status: SHIPPED | OUTPATIENT
Start: 2025-05-16 | End: 2026-05-16

## 2025-05-17 ENCOUNTER — PHARMACY VISIT (OUTPATIENT)
Dept: PHARMACY | Facility: CLINIC | Age: 50
End: 2025-05-17
Payer: COMMERCIAL

## 2025-05-27 PROCEDURE — RXMED WILLOW AMBULATORY MEDICATION CHARGE

## 2025-05-29 ENCOUNTER — APPOINTMENT (OUTPATIENT)
Dept: PAIN MEDICINE | Facility: CLINIC | Age: 50
End: 2025-05-29
Payer: COMMERCIAL

## 2025-06-03 ENCOUNTER — APPOINTMENT (OUTPATIENT)
Dept: ORTHOPEDIC SURGERY | Facility: CLINIC | Age: 50
End: 2025-06-03
Payer: COMMERCIAL

## 2025-06-09 ENCOUNTER — PHARMACY VISIT (OUTPATIENT)
Dept: PHARMACY | Facility: CLINIC | Age: 50
End: 2025-06-09
Payer: COMMERCIAL

## 2025-06-14 PROCEDURE — RXMED WILLOW AMBULATORY MEDICATION CHARGE

## 2025-06-16 ENCOUNTER — PHARMACY VISIT (OUTPATIENT)
Dept: PHARMACY | Facility: CLINIC | Age: 50
End: 2025-06-16
Payer: COMMERCIAL

## 2025-06-16 PROCEDURE — RXOTC WILLOW AMBULATORY OTC CHARGE

## 2025-07-09 ENCOUNTER — APPOINTMENT (OUTPATIENT)
Dept: PAIN MEDICINE | Facility: CLINIC | Age: 50
End: 2025-07-09
Payer: COMMERCIAL

## 2025-07-09 ENCOUNTER — OFFICE VISIT (OUTPATIENT)
Dept: PAIN MEDICINE | Facility: CLINIC | Age: 50
End: 2025-07-09
Payer: COMMERCIAL

## 2025-07-09 VITALS — DIASTOLIC BLOOD PRESSURE: 80 MMHG | HEART RATE: 106 BPM | RESPIRATION RATE: 16 BRPM | SYSTOLIC BLOOD PRESSURE: 138 MMHG

## 2025-07-09 DIAGNOSIS — M43.10 ACQUIRED SPONDYLOLISTHESIS: ICD-10-CM

## 2025-07-09 DIAGNOSIS — Z98.1 S/P SPINAL FUSION: ICD-10-CM

## 2025-07-09 DIAGNOSIS — M54.17 LUMBOSACRAL RADICULOPATHY: Primary | ICD-10-CM

## 2025-07-09 DIAGNOSIS — M47.27 OSTEOARTHRITIS OF SPINE WITH RADICULOPATHY, LUMBOSACRAL REGION: ICD-10-CM

## 2025-07-09 PROCEDURE — 99213 OFFICE O/P EST LOW 20 MIN: CPT | Performed by: PHYSICIAN ASSISTANT

## 2025-07-09 RX ORDER — GABAPENTIN 600 MG/1
600 TABLET ORAL 2 TIMES DAILY
COMMUNITY
End: 2025-07-09 | Stop reason: SDUPTHER

## 2025-07-09 RX ORDER — GABAPENTIN 600 MG/1
TABLET ORAL
Qty: 270 TABLET | Refills: 1 | Status: SHIPPED | OUTPATIENT
Start: 2025-07-09

## 2025-07-09 ASSESSMENT — ENCOUNTER SYMPTOMS
CONSTITUTIONAL NEGATIVE: 1
HEMATOLOGIC/LYMPHATIC NEGATIVE: 1
MYALGIAS: 1
BACK PAIN: 1
NUMBNESS: 1
ARTHRALGIAS: 1
EYES NEGATIVE: 1
ENDOCRINE NEGATIVE: 1
WEAKNESS: 1
PSYCHIATRIC NEGATIVE: 1
GASTROINTESTINAL NEGATIVE: 1
ALLERGIC/IMMUNOLOGIC NEGATIVE: 1
RESPIRATORY NEGATIVE: 1
CARDIOVASCULAR NEGATIVE: 1

## 2025-07-09 NOTE — PROGRESS NOTES
"Subjective   Patient ID: Vickie Vuong is a 50 y.o. female who presents for Follow-up (FOLLOW UP GABAPENTIN, SHE IS TAKING 600MG Q AM AND 1200MG AT NIGHT AND IS TOLERATING THE DOSE, SHE STATES IT IS CONTROLLING HER NERVE PAIN IN THE LOWER BACK , SHE NOTES SHE HAS BEEN HAVING  A LOT OF TINGLING IN HER FEET AT NIGHT LYING DOWN IN BED, IT IS CONSTANT, \"ZINGING NERVE\" FEELING, ). CONTINUES HOME EXERCISES AND STRETCHING, HEATING PAD PRN, PAIN SCORE TODAY 1/10, ANA=12%     Marley Shaikh, CMA 07/09/25 1:29 PM     Patient is a 50-year-old female.  She presents today for a follow-up after switching back to gabapentin from Lyrica.  She is using 600 mg in the morning and 1200 mg at night.  She feels that this overall is helpful to her.  It is controlling her pain.  She has a history of L5-S1 ALIF on 7/15/15 done by Dr. Gianni Cordova and then a posterior lumbar fusion L4-S1 in 2017 by Dr. Humza Boyd.  She does not want to have any injections.  She has some numbness in her feet.  She states that overall, things are going very well.  She is feeling well.  She is comfortable and happy.  She has some tingling in her feet at night when she lays in a certain position.        Review of Systems   Constitutional: Negative.    HENT: Negative.     Eyes: Negative.    Respiratory: Negative.     Cardiovascular: Negative.    Gastrointestinal: Negative.    Endocrine: Negative.    Genitourinary: Negative.    Musculoskeletal:  Positive for arthralgias, back pain, gait problem and myalgias.   Skin: Negative.    Allergic/Immunologic: Negative.    Neurological:  Positive for weakness and numbness.   Hematological: Negative.    Psychiatric/Behavioral: Negative.         Objective   Physical Exam  Vitals and nursing note reviewed.   Constitutional:       General: She is not in acute distress.     Appearance: Normal appearance. She is not ill-appearing.   HENT:      Head: Normocephalic and atraumatic.      Right Ear: External ear normal.      Left Ear: " External ear normal.      Nose: Nose normal.      Mouth/Throat:      Pharynx: Oropharynx is clear.   Eyes:      Conjunctiva/sclera: Conjunctivae normal.   Cardiovascular:      Rate and Rhythm: Normal rate and regular rhythm.      Pulses: Normal pulses.   Pulmonary:      Effort: Pulmonary effort is normal.      Breath sounds: Normal breath sounds.   Musculoskeletal:         General: Normal range of motion.      Cervical back: Normal range of motion.      Comments: 5/5 strength   Skin:     General: Skin is warm and dry.   Neurological:      General: No focal deficit present.      Mental Status: She is alert and oriented to person, place, and time. Mental status is at baseline.   Psychiatric:         Mood and Affect: Mood normal.         Behavior: Behavior normal.         Thought Content: Thought content normal.         Judgment: Judgment normal.         MR lumbar spine wo IV contrast  Status: Final result     PACS Images     Show images for MR lumbar spine wo IV contrast  Signed by    Signed Time Phone Pager   Rizwan Fontana MD 12/30/2024 10:18 [phone]      Exam Information    Status Exam Begun Exam Ended   Final 12/27/2024 17:25 12/27/2024 18:22     Study Result    Narrative & Impression   Interpreted By:  Rizwan Fontana,   STUDY:  MR LUMBAR SPINE WO IV CONTRAST; 12/27/2024 6:22 pm      INDICATION:  Signs/Symptoms:LOWER BACK AND LEG PAIN. S/P LUMBAR FUSION. .      COMPARISON:  09/18/2023.      ACCESSION NUMBER(S):  [id number]      ORDERING CLINICIAN:  SUBHA ESTES      TECHNIQUE:  Sagittal, axial, T1, T2, stir images were obtained.      FINDINGS:  Alignment: Pars defects, grade 1 spondylolisthesis at L5-S1.  Postoperative laminotomy, posterior fusion at L4-5, L5-S1.  Intervertebral disc space device placement at L5-S1, unchanged since  last exam. No gross hardware complication. Mild anterolisthesis at  L3-4. No pars defects.      Vertebrae/Intervertebral Discs: No compression fracture. Disc  desiccation at  L3-4, L4-5. Postoperative scarring at L4-5, L5-S1.      Conus: The lower thoracic cord appears unremarkable. The conus  terminates at T12-L1.      T12-L1, L1-2: No disc bulging, disc protrusion. No central spinal  stenosis or neural foramina narrowing. Posterior elements are  unremarkable.      L1-2: No disc bulging, disc protrusion. No central spinal stenosis or  neural foramina narrowing. Posterior elements are unremarkable.      L2-3: No disc bulging, disc protrusion. No central spinal stenosis or  neural foramina narrowing. Posterior elements are unremarkable.      L3-4: Mild broad-based posterior disc osteophyte complex, hypertrophy  of the facet joints, causing mild deformity of thecal sac.  Mild-to-moderate bilateral neural foraminal narrowing, causing mild  encroachment of the bilateral exiting nerve roots.      L4-5: Postoperative laminectomy, posterior fusion. No disc bulging,  disc protrusion. Hypertrophy of the facet joints, causing mild  bilateral neural foramina narrowing. No encroachment of the exiting  nerve roots.      L5-S1: Postoperative laminotomy, posterior fusion, intervertebral  disc space device placement. Stable grade 1 spondylolisthesis. Mild  central spinal stenosis. Mild-to-moderate hypertrophy of the facet  joints, causing mild-to-moderate bilateral neural foramina narrowing  and mild encroachment of the bilateral exiting nerve roots.      IMPRESSION:  Postoperative laminotomy, posterior fusion, intervertebral disc space  device placement at L5-S1. Stable grade 1 spondylolisthesis. Mild  central spinal stenosis. Mild-to-moderate hypertrophy of the facet  joints, causing mild-to-moderate bilateral neural foramina narrowing  and mild encroachment of the bilateral exiting nerve roots.      Stable postoperative laminotomy, posterior fusion at L4-5. No central  spinal stenosis. Mild bilateral neural foramina narrowing. No  encroachment of the exiting nerve roots.      Mild central spinal  stenosis, mild-to-moderate bilateral neural  foraminal narrowing involving L3-4, causing mild encroachment of the  bilateral exiting nerve roots.          Signed by: Rizwan Fontana 12/30/2024 10:18 AM  Dictation workstation:   [id number]     Assessment/Plan   Diagnoses and all orders for this visit:  Lumbosacral radiculopathy  -     gabapentin (Neurontin) 600 mg tablet; 600 mg q am, 1200 mg qhs  Osteoarthritis of spine with radiculopathy, lumbosacral region  -     gabapentin (Neurontin) 600 mg tablet; 600 mg q am, 1200 mg qhs  S/P spinal fusion  -     gabapentin (Neurontin) 600 mg tablet; 600 mg q am, 1200 mg qhs  Acquired spondylolisthesis  -     gabapentin (Neurontin) 600 mg tablet; 600 mg q am, 1200 mg qhs       Patient is a 50-year-old female with a past medical history significant for the above-mentioned medical diagnoses.  At this time, she is happy on the gabapentin.  She is using 1 in the morning and 2 at night.  She tolerates these well.  OARRS was reviewed.  She feels at this is helpful to her.  At this time, she does not want to make any changes.  She will follow-up in 6 months for med management.  Call us in the interim should she require anything from our services.

## 2025-08-12 PROCEDURE — RXMED WILLOW AMBULATORY MEDICATION CHARGE

## 2025-08-17 PROCEDURE — RXMED WILLOW AMBULATORY MEDICATION CHARGE

## 2025-08-19 ENCOUNTER — PHARMACY VISIT (OUTPATIENT)
Dept: PHARMACY | Facility: CLINIC | Age: 50
End: 2025-08-19
Payer: COMMERCIAL